# Patient Record
Sex: MALE | Race: WHITE | Employment: OTHER | URBAN - METROPOLITAN AREA
[De-identification: names, ages, dates, MRNs, and addresses within clinical notes are randomized per-mention and may not be internally consistent; named-entity substitution may affect disease eponyms.]

---

## 2019-04-11 ENCOUNTER — APPOINTMENT (OUTPATIENT)
Dept: CT IMAGING | Age: 84
DRG: 065 | End: 2019-04-11
Payer: MEDICARE

## 2019-04-11 ENCOUNTER — APPOINTMENT (OUTPATIENT)
Dept: GENERAL RADIOLOGY | Age: 84
DRG: 065 | End: 2019-04-11
Payer: MEDICARE

## 2019-04-11 ENCOUNTER — HOSPITAL ENCOUNTER (INPATIENT)
Age: 84
LOS: 4 days | Discharge: HOME | DRG: 065 | End: 2019-04-15
Attending: EMERGENCY MEDICINE | Admitting: INTERNAL MEDICINE
Payer: MEDICARE

## 2019-04-11 DIAGNOSIS — I63.9 ACUTE ISCHEMIC STROKE (HCC): Primary | ICD-10-CM

## 2019-04-11 DIAGNOSIS — R47.01 APHASIA: ICD-10-CM

## 2019-04-11 DIAGNOSIS — I63.9 ACUTE CVA (CEREBROVASCULAR ACCIDENT) (HCC): ICD-10-CM

## 2019-04-11 LAB
ANION GAP SERPL CALCULATED.3IONS-SCNC: 13 MEQ/L (ref 9–15)
ANISOCYTOSIS: ABNORMAL
APTT: >124 SEC (ref 21.6–35.4)
BASOPHILS ABSOLUTE: 0 K/UL (ref 0–0.2)
BASOPHILS RELATIVE PERCENT: 0.7 %
BUN BLDV-MCNC: 16 MG/DL (ref 8–23)
CALCIUM SERPL-MCNC: 9.3 MG/DL (ref 8.5–9.9)
CHLORIDE BLD-SCNC: 98 MEQ/L (ref 95–107)
CHP ED QC CHECK: NORMAL
CO2: 25 MEQ/L (ref 20–31)
CREAT SERPL-MCNC: 0.88 MG/DL (ref 0.7–1.2)
EKG ATRIAL RATE: 37 BPM
EKG Q-T INTERVAL: 466 MS
EKG QRS DURATION: 156 MS
EKG QTC CALCULATION (BAZETT): 495 MS
EKG R AXIS: 263 DEGREES
EKG T AXIS: 67 DEGREES
EKG VENTRICULAR RATE: 68 BPM
EOSINOPHILS ABSOLUTE: 0.1 K/UL (ref 0–0.7)
EOSINOPHILS RELATIVE PERCENT: 1.3 %
GFR AFRICAN AMERICAN: >60
GFR NON-AFRICAN AMERICAN: >60
GLUCOSE BLD-MCNC: 86 MG/DL
GLUCOSE BLD-MCNC: 86 MG/DL (ref 60–115)
GLUCOSE BLD-MCNC: 89 MG/DL (ref 70–99)
HCT VFR BLD CALC: 37.8 % (ref 42–52)
HEMOGLOBIN: 12.2 G/DL (ref 14–18)
INR BLD: 1.1
LYMPHOCYTES ABSOLUTE: 2.5 K/UL (ref 1–4.8)
LYMPHOCYTES RELATIVE PERCENT: 41.8 %
MCH RBC QN AUTO: 22.2 PG (ref 27–31.3)
MCHC RBC AUTO-ENTMCNC: 32.2 % (ref 33–37)
MCV RBC AUTO: 69.1 FL (ref 80–100)
MICROCYTES: ABNORMAL
MONOCYTES ABSOLUTE: 0.5 K/UL (ref 0.2–0.8)
MONOCYTES RELATIVE PERCENT: 8.9 %
NEUTROPHILS ABSOLUTE: 2.8 K/UL (ref 1.4–6.5)
NEUTROPHILS RELATIVE PERCENT: 47.3 %
PDW BLD-RTO: 16.5 % (ref 11.5–14.5)
PERFORMED ON: NORMAL
PLATELET # BLD: 138 K/UL (ref 130–400)
POC CREATININE WHOLE BLOOD: 0.9
POIKILOCYTES: ABNORMAL
POTASSIUM SERPL-SCNC: 4.5 MEQ/L (ref 3.4–4.9)
PROTHROMBIN TIME: 11.1 SEC (ref 9–11.5)
RBC # BLD: 5.48 M/UL (ref 4.7–6.1)
SODIUM BLD-SCNC: 136 MEQ/L (ref 135–144)
TROPONIN: <0.01 NG/ML (ref 0–0.01)
TROPONIN: <0.01 NG/ML (ref 0–0.01)
WBC # BLD: 5.9 K/UL (ref 4.8–10.8)

## 2019-04-11 PROCEDURE — 85025 COMPLETE CBC W/AUTO DIFF WBC: CPT

## 2019-04-11 PROCEDURE — 71045 X-RAY EXAM CHEST 1 VIEW: CPT

## 2019-04-11 PROCEDURE — 6370000000 HC RX 637 (ALT 250 FOR IP): Performed by: NURSE PRACTITIONER

## 2019-04-11 PROCEDURE — 6370000000 HC RX 637 (ALT 250 FOR IP): Performed by: INTERNAL MEDICINE

## 2019-04-11 PROCEDURE — 2580000003 HC RX 258: Performed by: EMERGENCY MEDICINE

## 2019-04-11 PROCEDURE — 85730 THROMBOPLASTIN TIME PARTIAL: CPT

## 2019-04-11 PROCEDURE — 70450 CT HEAD/BRAIN W/O DYE: CPT

## 2019-04-11 PROCEDURE — 85610 PROTHROMBIN TIME: CPT

## 2019-04-11 PROCEDURE — 82565 ASSAY OF CREATININE: CPT

## 2019-04-11 PROCEDURE — 2580000003 HC RX 258: Performed by: NURSE PRACTITIONER

## 2019-04-11 PROCEDURE — 82948 REAGENT STRIP/BLOOD GLUCOSE: CPT

## 2019-04-11 PROCEDURE — 36415 COLL VENOUS BLD VENIPUNCTURE: CPT

## 2019-04-11 PROCEDURE — 93005 ELECTROCARDIOGRAM TRACING: CPT

## 2019-04-11 PROCEDURE — 80048 BASIC METABOLIC PNL TOTAL CA: CPT

## 2019-04-11 PROCEDURE — 84484 ASSAY OF TROPONIN QUANT: CPT

## 2019-04-11 PROCEDURE — 6360000002 HC RX W HCPCS: Performed by: EMERGENCY MEDICINE

## 2019-04-11 PROCEDURE — 99285 EMERGENCY DEPT VISIT HI MDM: CPT

## 2019-04-11 PROCEDURE — 1210000000 HC MED SURG R&B

## 2019-04-11 RX ORDER — SODIUM CHLORIDE 0.9 % (FLUSH) 0.9 %
10 SYRINGE (ML) INJECTION PRN
Status: DISCONTINUED | OUTPATIENT
Start: 2019-04-11 | End: 2019-04-15 | Stop reason: HOSPADM

## 2019-04-11 RX ORDER — ATORVASTATIN CALCIUM 40 MG/1
40 TABLET, FILM COATED ORAL NIGHTLY
Status: DISCONTINUED | OUTPATIENT
Start: 2019-04-11 | End: 2019-04-15 | Stop reason: HOSPADM

## 2019-04-11 RX ORDER — ONDANSETRON 2 MG/ML
4 INJECTION INTRAMUSCULAR; INTRAVENOUS EVERY 6 HOURS PRN
Status: DISCONTINUED | OUTPATIENT
Start: 2019-04-11 | End: 2019-04-15 | Stop reason: HOSPADM

## 2019-04-11 RX ORDER — HEPARIN SODIUM 10000 [USP'U]/100ML
18 INJECTION, SOLUTION INTRAVENOUS CONTINUOUS
Status: DISCONTINUED | OUTPATIENT
Start: 2019-04-11 | End: 2019-04-12

## 2019-04-11 RX ORDER — HEPARIN SODIUM 5000 [USP'U]/ML
80 INJECTION, SOLUTION INTRAVENOUS; SUBCUTANEOUS PRN
Status: DISCONTINUED | OUTPATIENT
Start: 2019-04-11 | End: 2019-04-12

## 2019-04-11 RX ORDER — 0.9 % SODIUM CHLORIDE 0.9 %
1000 INTRAVENOUS SOLUTION INTRAVENOUS ONCE
Status: COMPLETED | OUTPATIENT
Start: 2019-04-11 | End: 2019-04-11

## 2019-04-11 RX ORDER — ASPIRIN 81 MG/1
81 TABLET ORAL DAILY
Status: DISCONTINUED | OUTPATIENT
Start: 2019-04-11 | End: 2019-04-15 | Stop reason: HOSPADM

## 2019-04-11 RX ORDER — SODIUM CHLORIDE 0.9 % (FLUSH) 0.9 %
10 SYRINGE (ML) INJECTION EVERY 12 HOURS SCHEDULED
Status: DISCONTINUED | OUTPATIENT
Start: 2019-04-11 | End: 2019-04-15 | Stop reason: HOSPADM

## 2019-04-11 RX ORDER — CARVEDILOL 12.5 MG/1
12.5 TABLET ORAL 2 TIMES DAILY WITH MEALS
Status: ON HOLD | COMMUNITY
End: 2021-01-15 | Stop reason: HOSPADM

## 2019-04-11 RX ORDER — CARVEDILOL 12.5 MG/1
12.5 TABLET ORAL 2 TIMES DAILY WITH MEALS
Status: DISCONTINUED | OUTPATIENT
Start: 2019-04-12 | End: 2019-04-15 | Stop reason: HOSPADM

## 2019-04-11 RX ORDER — HEPARIN SODIUM 5000 [USP'U]/ML
80 INJECTION, SOLUTION INTRAVENOUS; SUBCUTANEOUS ONCE
Status: COMPLETED | OUTPATIENT
Start: 2019-04-11 | End: 2019-04-11

## 2019-04-11 RX ORDER — HEPARIN SODIUM 5000 [USP'U]/ML
40 INJECTION, SOLUTION INTRAVENOUS; SUBCUTANEOUS PRN
Status: DISCONTINUED | OUTPATIENT
Start: 2019-04-11 | End: 2019-04-12

## 2019-04-11 RX ORDER — HYDRALAZINE HYDROCHLORIDE 20 MG/ML
10 INJECTION INTRAMUSCULAR; INTRAVENOUS EVERY 6 HOURS PRN
Status: DISCONTINUED | OUTPATIENT
Start: 2019-04-11 | End: 2019-04-15 | Stop reason: HOSPADM

## 2019-04-11 RX ADMIN — SODIUM CHLORIDE 1000 ML: 9 INJECTION, SOLUTION INTRAVENOUS at 18:24

## 2019-04-11 RX ADMIN — SACUBITRIL AND VALSARTAN 1 TABLET: 24; 26 TABLET, FILM COATED ORAL at 22:49

## 2019-04-11 RX ADMIN — ASPIRIN 81 MG: 81 TABLET, COATED ORAL at 22:01

## 2019-04-11 RX ADMIN — ATORVASTATIN CALCIUM 40 MG: 40 TABLET, FILM COATED ORAL at 22:49

## 2019-04-11 RX ADMIN — HEPARIN SODIUM 6350 UNITS: 5000 INJECTION INTRAVENOUS; SUBCUTANEOUS at 18:39

## 2019-04-11 RX ADMIN — Medication 10 ML: at 22:01

## 2019-04-11 RX ADMIN — HEPARIN SODIUM AND DEXTROSE 18 UNITS/KG/HR: 10000; 5 INJECTION INTRAVENOUS at 18:40

## 2019-04-11 ASSESSMENT — ENCOUNTER SYMPTOMS
VOMITING: 0
NAUSEA: 0
SORE THROAT: 0
BACK PAIN: 0
ABDOMINAL PAIN: 0
SHORTNESS OF BREATH: 0
DIARRHEA: 0

## 2019-04-11 NOTE — ED NOTES
Patient presents to the ER via [address] after having slurred speech around [phone] that started after he received a scam phone call trying to get money from him. Patient has a slight right sided facial droop, slurred speech, and expressive asphasia. Dr. Sury Guzman called to bedside. Patient is negative for weakness in extremities. Ambulatory when patient arrived to the ER. A&Ox4. Code Bat called at [facility].      Yesika Negrete RN  04/11/19 [id number]

## 2019-04-11 NOTE — ED NOTES
Bed: 05  Expected date: 4/11/19  Expected time: 5:51 PM  Means of arrival: [facility]  Comments:  79 yo male  Lightheaded, slurred speech, now resolved  186/150 HR 85 98% RA  18 L AC with labs OT [address], RN  04/11/19 5660

## 2019-04-11 NOTE — H&P
Hospital Medicine History & Physical      PCP: No primary care provider on file. Date of Admission: 4/11/2019    Date of Service: Pt seen/examined on 4/11/19 and Admitted to Inpatient with expected LOS greater than two midnights due to medical therapy. Chief Complaint:  Stroke like symptoms       History Of Present Illness:      80 y.o. male who presented to [facility] ED with right sided facial droop, slurred speech, and aphasia. History of atrial fibrillation on beta blocker but no blood thinner, pacemaker in place. [facility]. Patients wife states symptoms occurred around [id number] after getting a phone call from a [employer] service. He was shouting at person on other end of line and suddenly was unable to speak and form words. He denies every having extremity weakness, numbness, tingling, or loss of sensation. He was able to ambulate with a steady gait. He currently denies cp/sob/n/v/d/fever/chills/rash. He was started on heparin gtt per neurology due to his history of atrial fibrillation and not currently being on anticoagulants. Past Medical History:          Diagnosis Date    Atrial fibrillation ([facility] [address]     Colon cancer [facility])        Past Surgical History:          Procedure Laterality Date    PACEMAKER PLACEMENT         Medications Prior to Admission:      Prior to Admission medications    Medication Sig Start Date End Date Taking? Authorizing Provider   carvedilol (COREG) 12.5 MG tablet Take 12.5 mg by mouth 2 times daily (with meals)   Yes Historical Provider, MD   sacubitril-valsartan (ENTRESTO) 24-26 MG per tablet Take 1 tablet by mouth 2 times daily   Yes Historical Provider, MD       Allergies:  Penicillins    Social History:      The patient currently lives with wife    TOBACCO:   reports that he has never smoked. He has never used smokeless tobacco.  ETOH:   reports that he drinks alcohol.       Family History:       Reviewed in detail and

## 2019-04-11 NOTE — ED NOTES
Report called to 2N at this time. Patient placed on telemetry. No distress noted.       Mir Gutiérrez RN  04/11/19 1942

## 2019-04-11 NOTE — ED PROVIDER NOTES
[address] [facility] ED  eMERGENCY dEPARTMENT eNCOUnter      Pt Name: Samuel Rubi  MRN: [id number]  Jeevangfgage 11/19/1932  Date of evaluation: 4/11/2019  Provider: Jaida Guzman MD     CHIEF COMPLAINT       Chief Complaint   Patient presents with    Aphasia       HISTORYOF PRESENT ILLNESS   (Location/Symptom, Timing/Onset, Context/Setting, Quality, Duration, ModifyingFactors, Severity) Note limiting factors. 63-year-old male who was diagnosed with atrial fibrillation 2 weeks ago, has an artificial heart valve, but no prior history of stroke presents with sudden onset expressive aphasia that happened while he was having a fairly heated conversation with a supposedly tell a marker that was conducting a scan. He was very upset by this and was shouting at the individual on the phone. He then suddenly was unable to speak and had trouble forming his words as well as developed right lower facial droop. He had no weakness anywhere and was still able to ambulate without difficulty but couldn't get his words out. This has never happened before. He was very recently diagnosed with the atrial fibrillation and was prescribed an anticoagulant but has not yet picked it up from the pharmacy. Nursing Notes werereviewed. REVIEW OF SYSTEMS    (2+ for level 4; 10+ for level 5)     Review of Systems   Constitutional: Negative for chills and fever. HENT: Negative for sore throat. Eyes: Negative for visual disturbance. Respiratory: Negative for shortness of breath. Cardiovascular: Negative for chest pain. Gastrointestinal: Negative for abdominal pain, diarrhea, nausea and vomiting. Endocrine: Negative for polyuria. Genitourinary: Negative for dysuria. Musculoskeletal: Negative for back pain and neck pain. Skin: Negative for rash. Neurological: Positive for facial asymmetry and speech difficulty. Negative for dizziness, weakness and headaches. Hematological: Negative for adenopathy. Psychiatric/Behavioral: Negative for confusion. All other systems reviewed and are negative. Except as noted above the remainder of the review of systems was reviewed and negative. PAST MEDICAL HISTORY     Past Medical History:   Diagnosis Date    Atrial fibrillation ([facility] [address]     Colon cancer ([facility]        SURGICAL HISTORY        Past Surgical History:   Procedure Laterality Date    PACEMAKER PLACEMENT         CURRENT MEDICATIONS       Previous Medications    CARVEDILOL (COREG) 12.5 MG TABLET    Take 12.5 mg by mouth 2 times daily (with meals)    SACUBITRIL-VALSARTAN (ENTRESTO) 24-26 MG PER TABLET    Take 1 tablet by mouth 2 times daily       ALLERGIES     Penicillins    FAMILY HISTORY     History reviewed. No pertinent family history.        SOCIAL HISTORY       Social History     Socioeconomic History    Marital status:      Spouse name: None    Number of children: None    Years of education: None    Highest education level: None   Occupational History    None   Social Needs    Financial resource strain: None    Food insecurity:     Worry: None     Inability: None    Transportation needs:     Medical: None     Non-medical: None   Tobacco Use    Smoking status: Never Smoker    Smokeless tobacco: Never Used   Substance and Sexual Activity    Alcohol use: Yes     Comment: glass of wine daily     Drug use: None    Sexual activity: None   Lifestyle    Physical activity:     Days per week: None     Minutes per session: None    Stress: None   Relationships    Social connections:     Talks on phone: None     Gets together: None     Attends Mandaeism service: None     Active member of club or organization: None     Attends meetings of clubs or organizations: None     Relationship status: None    Intimate partner violence:     Fear of current or ex partner: None     Emotionally abused: None     Physically abused: None     Forced sexual activity: None   Other Topics Concern    None Social History Narrative    None       SCREENINGS   NIH Stroke Scale  Interval: Pre-Treatment  Level of Consciousness (1a. ): Alert  LOC Questions (1b. ): Answers both correctly  LOC Commands (1c. ): Obeys both correctly  Best Gaze (2. ): Normal  Visual (3. ): No visual loss  Facial Palsy (4. ): Normal  Motor Arm, Left (5a. ): No drift  Motor Arm, Right (5b. ): No drift  Motor Leg, Left (6a. ): No drift  Motor Leg, Right (6b. ): No drift  Limb Ataxia (7. ): Absent  Sensory (8. ): Normal  Best Language (9. ): Mild to moderate aphasia  Dysarthria (10. ): Normal  Extinction and Inattention (11): No neglect  Total: 1       PHYSICAL EXAM    (up to 7 for level 4, 8 or more for level 5)     ED Triage Vitals   BP Temp Temp Source Pulse Resp SpO2 Height Weight   04/11/19 1754 04/11/19 1754 04/11/19 1754 04/11/19 1754 04/11/19 1754 04/11/19 1838 -- 04/11/19 1808   (!) 168/110 98.6 °F (37 °C) Oral 76 16 99 %  174 lb 9.7 oz (79.2 kg)       Physical Exam   Constitutional: He is oriented to person, place, and time. He appears well-developed and well-nourished. No distress. HENT:   Head: Normocephalic and atraumatic. Nose: Nose normal.   Mouth/Throat: No oropharyngeal exudate. Eyes: Pupils are equal, round, and reactive to light. Conjunctivae are normal. Right eye exhibits no discharge. Left eye exhibits no discharge. Neck: Normal range of motion. Neck supple. Cardiovascular: Normal rate. Exam reveals no friction rub. No murmur heard. Artificial heart valve   Pulmonary/Chest: Effort normal and breath sounds normal. No stridor. No respiratory distress. He has no wheezes. Abdominal: Soft. Bowel sounds are normal. He exhibits no distension. There is no rebound and no guarding. Musculoskeletal: Normal range of motion. He exhibits no edema or tenderness. Lymphadenopathy:     He has no cervical adenopathy. Neurological: He is alert and oriented to person, place, and time. He displays normal reflexes.  No cranial nerve deficit. Coordination normal.   Skin: Skin is warm and dry. No rash noted. Psychiatric: He has a normal mood and affect. His behavior is normal. Thought content normal.   Nursing note and vitals reviewed. DIAGNOSTIC RESULTS     EKG: All EKG's are interpreted by the EmergencyDepartment Physician who either signs or Co-signs this chart in the absence of a cardiologist.    Ventricular paced rhythm, rate 68, biventricular pacemaker detected, no acute ischemic findings    RADIOLOGY:   Non-plain film images such as CT, Ultrasound and MRI are read by the radiologist. Plain radiographic images are visualized and preliminarily interpreted by the emergency physician with the below findings:    Initial noncontrast brain CT negative per radiology. Results were called immediately on availability. Interpretation per the Radiologist below (ifavailable at the time of note entry):    XR CHEST PORTABLE   Final Result   NO ACUTE CHEST DISEASE. CT Head WO Contrast   Final Result   NEGATIVE CT BRAIN WITHOUT CONTRAST. Results were called by me to Gal Jamison MD in the ED at 4/11/2019 6:14 PM.                LABS:  Labs Reviewed   CBC WITH AUTO DIFFERENTIAL - Abnormal; Notable for the following components:       Result Value    Hemoglobin 12.2 (*)     Hematocrit 37.8 (*)     MCV 69.1 (*)     MCH 22.2 (*)     MCHC 32.2 (*)     RDW 16.5 (*)     All other components within normal limits   POCT GLUCOSE - Normal   POCT CREATININE - Normal   BASIC METABOLIC PANEL   PROTIME-INR   TROPONIN   APTT   APTT   APTT   POCT GLUCOSE       All other labs were within normal range or not returned as of this dictation.     EMERGENCY DEPARTMENT COURSE and DIFFERENTIAL DIAGNOSIS/MDM:   Vitals:    Vitals:    04/11/19 1754 04/11/19 1758 04/11/19 1808 04/11/19 1838   BP: (!) 168/110   (!) 166/85   Pulse: 76 66     Resp: 16      Temp: 98.6 °F (37 °C)      TempSrc: Oral      SpO2:    99%   Weight:   174 lb 9.7 oz (79.2 kg) [facility]    He was made a stroke team on arrival.  He had slight right lower facial droop, expressive aphasia, but no other symptoms. He was still able to joke and smile. He is not confused at all. His noncontrast brain CT was negative. I discussed the case with Dr. Martha Peters, the on-call stroke neurologist who recommended having a discussion with the family regarding the risks of TPA because this particular type of stroke is at a higher risk for bleeding. I spoke at length with the patient, his wife, and his daughter and they do not want to proceed with TPA administration. TPA was therefore not administered, on entering the wishes of the patient and his family members. He states that he is able to live with these symptoms and he can interact quite well and is actually fairly jovial and able to smile at his wife. He absolutely does not want to proceed with TPA and he appears to have capacity to make an informed decision about his healthcare. I started him on a heparin drip and we will admit him to the hospital for further testing. I spoke at length with his family members as well regarding the course of treatment and his other test results. On reexamination, his expressive aphasia is unchanged but he is able to interact quite well with family and can move all his extremities without difficulty and appears to be able to perform his activities of daily living without trouble. I spoke with Dr. Keri Valenzuela and he will be admitted to telemetry. CRITICAL CARE TIME     Total Critical Care time(not applicable if blank) 45    Total minutes, excluding separately reportable procedures. There was a high probability of clinically significant/life threatening deterioration in the patient's condition which required my urgent intervention.  This includesdiscussing the case with consultants, reviewing laboratory studies and images independently, arranging disposition, and speaking with

## 2019-04-12 ENCOUNTER — APPOINTMENT (OUTPATIENT)
Dept: MRI IMAGING | Age: 84
DRG: 065 | End: 2019-04-12
Payer: MEDICARE

## 2019-04-12 ENCOUNTER — APPOINTMENT (OUTPATIENT)
Dept: ULTRASOUND IMAGING | Age: 84
DRG: 065 | End: 2019-04-12
Payer: MEDICARE

## 2019-04-12 LAB
ANION GAP SERPL CALCULATED.3IONS-SCNC: 11 MEQ/L (ref 9–15)
APTT: 47.6 SEC (ref 21.6–35.4)
APTT: 71.8 SEC (ref 21.6–35.4)
BUN BLDV-MCNC: 14 MG/DL (ref 8–23)
CALCIUM SERPL-MCNC: 8.3 MG/DL (ref 8.5–9.9)
CHLORIDE BLD-SCNC: 101 MEQ/L (ref 95–107)
CHOLESTEROL, TOTAL: 138 MG/DL (ref 0–199)
CO2: 23 MEQ/L (ref 20–31)
CREAT SERPL-MCNC: 0.66 MG/DL (ref 0.7–1.2)
GFR AFRICAN AMERICAN: >60
GFR NON-AFRICAN AMERICAN: >60
GLUCOSE BLD-MCNC: 91 MG/DL (ref 70–99)
HCT VFR BLD CALC: 35.2 % (ref 42–52)
HDLC SERPL-MCNC: 45 MG/DL (ref 40–59)
HEMOGLOBIN: 11.2 G/DL (ref 14–18)
LDL CHOLESTEROL CALCULATED: 83 MG/DL (ref 0–129)
LV EF: 55 %
LVEF MODALITY: NORMAL
MCH RBC QN AUTO: 22 PG (ref 27–31.3)
MCHC RBC AUTO-ENTMCNC: 31.8 % (ref 33–37)
MCV RBC AUTO: 69.2 FL (ref 80–100)
PDW BLD-RTO: 16.7 % (ref 11.5–14.5)
PLATELET # BLD: 119 K/UL (ref 130–400)
POTASSIUM REFLEX MAGNESIUM: 3.9 MEQ/L (ref 3.4–4.9)
RBC # BLD: 5.09 M/UL (ref 4.7–6.1)
SODIUM BLD-SCNC: 135 MEQ/L (ref 135–144)
TRIGL SERPL-MCNC: 52 MG/DL (ref 0–150)
TROPONIN: <0.01 NG/ML (ref 0–0.01)
WBC # BLD: 5.5 K/UL (ref 4.8–10.8)

## 2019-04-12 PROCEDURE — 6370000000 HC RX 637 (ALT 250 FOR IP): Performed by: PSYCHIATRY & NEUROLOGY

## 2019-04-12 PROCEDURE — APPNB30 APP NON BILLABLE TIME 0-30 MINS: Performed by: NURSE PRACTITIONER

## 2019-04-12 PROCEDURE — 6370000000 HC RX 637 (ALT 250 FOR IP): Performed by: INTERNAL MEDICINE

## 2019-04-12 PROCEDURE — 80061 LIPID PANEL: CPT

## 2019-04-12 PROCEDURE — 6370000000 HC RX 637 (ALT 250 FOR IP): Performed by: NURSE PRACTITIONER

## 2019-04-12 PROCEDURE — 36415 COLL VENOUS BLD VENIPUNCTURE: CPT

## 2019-04-12 PROCEDURE — 97165 OT EVAL LOW COMPLEX 30 MIN: CPT

## 2019-04-12 PROCEDURE — 93010 ELECTROCARDIOGRAM REPORT: CPT | Performed by: INTERNAL MEDICINE

## 2019-04-12 PROCEDURE — 93880 EXTRACRANIAL BILAT STUDY: CPT

## 2019-04-12 PROCEDURE — 93306 TTE W/DOPPLER COMPLETE: CPT

## 2019-04-12 PROCEDURE — 97161 PT EVAL LOW COMPLEX 20 MIN: CPT

## 2019-04-12 PROCEDURE — 70544 MR ANGIOGRAPHY HEAD W/O DYE: CPT

## 2019-04-12 PROCEDURE — 99223 1ST HOSP IP/OBS HIGH 75: CPT | Performed by: INTERNAL MEDICINE

## 2019-04-12 PROCEDURE — 2580000003 HC RX 258: Performed by: NURSE PRACTITIONER

## 2019-04-12 PROCEDURE — 6360000002 HC RX W HCPCS: Performed by: NURSE PRACTITIONER

## 2019-04-12 PROCEDURE — 92523 SPEECH SOUND LANG COMPREHEN: CPT

## 2019-04-12 PROCEDURE — 85730 THROMBOPLASTIN TIME PARTIAL: CPT

## 2019-04-12 PROCEDURE — 1210000000 HC MED SURG R&B

## 2019-04-12 PROCEDURE — 92610 EVALUATE SWALLOWING FUNCTION: CPT

## 2019-04-12 PROCEDURE — 70551 MRI BRAIN STEM W/O DYE: CPT

## 2019-04-12 PROCEDURE — 93880 EXTRACRANIAL BILAT STUDY: CPT | Performed by: INTERNAL MEDICINE

## 2019-04-12 PROCEDURE — 84484 ASSAY OF TROPONIN QUANT: CPT

## 2019-04-12 PROCEDURE — 80048 BASIC METABOLIC PNL TOTAL CA: CPT

## 2019-04-12 PROCEDURE — 85027 COMPLETE CBC AUTOMATED: CPT

## 2019-04-12 RX ADMIN — HEPARIN SODIUM AND DEXTROSE 15 UNITS/KG/HR: 10000; 5 INJECTION INTRAVENOUS at 00:07

## 2019-04-12 RX ADMIN — CARVEDILOL 12.5 MG: 12.5 TABLET, FILM COATED ORAL at 08:21

## 2019-04-12 RX ADMIN — CARVEDILOL 12.5 MG: 12.5 TABLET, FILM COATED ORAL at 17:27

## 2019-04-12 RX ADMIN — SACUBITRIL AND VALSARTAN 1 TABLET: 24; 26 TABLET, FILM COATED ORAL at 20:16

## 2019-04-12 RX ADMIN — Medication 10 ML: at 20:18

## 2019-04-12 RX ADMIN — HEPARIN SODIUM AND DEXTROSE 13 UNITS/KG/HR: 10000; 5 INJECTION INTRAVENOUS at 07:20

## 2019-04-12 RX ADMIN — Medication 10 ML: at 08:21

## 2019-04-12 RX ADMIN — SACUBITRIL AND VALSARTAN 1 TABLET: 24; 26 TABLET, FILM COATED ORAL at 08:21

## 2019-04-12 RX ADMIN — ASPIRIN 81 MG: 81 TABLET, COATED ORAL at 08:21

## 2019-04-12 RX ADMIN — APIXABAN 5 MG: 5 TABLET, FILM COATED ORAL at 17:26

## 2019-04-12 RX ADMIN — ATORVASTATIN CALCIUM 40 MG: 40 TABLET, FILM COATED ORAL at 20:16

## 2019-04-12 ASSESSMENT — ENCOUNTER SYMPTOMS
RESPIRATORY NEGATIVE: 1
SHORTNESS OF BREATH: 0
GASTROINTESTINAL NEGATIVE: 1
ALLERGIC/IMMUNOLOGIC NEGATIVE: 1
APNEA: 0
CHOKING: 0
COUGH: 0
STRIDOR: 0
BLOOD IN STOOL: 0
CHEST TIGHTNESS: 0
WHEEZING: 0
DIARRHEA: 0
EYES NEGATIVE: 1
NAUSEA: 0
VOMITING: 0

## 2019-04-12 NOTE — PROGRESS NOTES
Facility/Department: [facility]  Initial Speech/Language/Cognitive Assessment    NAME: Florie Spatz  : 1932   MRN: [id number]  ADMISSION DATE: 2019  ADMITTING DIAGNOSIS: has Acute CVA (cerebrovascular accident) [facility]) on their problem list.  DATE ONSET: 2019    Date of Eval: 2019   Evaluating Therapist: Yumiko Hinson, SLP    Assessment:  Cognitive Diagnosis: Mild cognitive-linguistic impairment characterized by impaired STM abilities, decreased complex problem solving abilities, and decreased insight into present deficits. Aphasia Diagnosis: Mild-moderate expressive aphasia characterized by impaired word finding abilities at the sentence and conversation levels. Speech Diagnosis: Mild-moderate dysarthria characterized by generalized right oral weakness resulting in imprecise speech movements with decreased intelligibility at all levels of speech. Communication Diagnosis: WFL   Diagnosis: Mild cognitive-linguistic impairment: Mild-moderate expressive aphasia; Mild-moderate dysarthria     Recommendations:  Requires SLP Intervention: Yes  Duration/Frequency of Treatment: 2-3x/week for LOS    Goals:  Short-term Goals  Timeframe for Short-term Goals: 1-2 weeks  Goal 1: Pt will be educated on strategies to improve speech intelligibility (abdominally supported breathing, over-articulation, etc.), and utilize them at the conversational level with min verbal cues from SLP so the patient has a better understanding of strategies that will enhance his/her ability to express his/her wants and needs. Goal 2: Pt will complete OM drills paired with speech sounds to improve speech intelligibility x10/each and expression of his/her complex thoughts, needs, feelings, and ideas.    Goal 3: Pt will be educated on word-finding strategies, and use them in structured and unstructured tasks in 80% of given opportunities with mild verbal cues to help the pt express his personal, safety, and medical needs in the Expression  Verbal Expression: Exceptions to functional limits  Responsive: Mild  Conversation: Mild  Effective Techniques: Provide extra time    Written Expression  Written Expression: (dnt)    Motor Speech  Motor Speech: Exceptions to [facility]  Intelligibility: Mild  Dysarthria : Mild    Pragmatics/Social Functioning  Pragmatics: Within functional limits    Cognition:      Orientation  Overall Orientation Status: Within Normal Limits  Attention  Attention: Within Functional Limits  Memory  Memory: Exceptions to [facility]  Short-term Memory: Mild  Problem Solving  Problem Solving: Exceptions to [facility]  Complex Functional Tasks: Mild  Verbal Reasoning Skills: Mild  Numeric Reasoning  Numeric Reasoning: (dnt)  Abstract Reasoning  Abstract Reasoning: (dnt)  Safety/Judgement  Safety/Judgement: Exceptions to [facility]  Insight: Mild      Prognosis:  Speech Therapy Prognosis  Prognosis: Good  Prognosis Considerations: Age;Previous Level of Function  Individuals consulted  Consulted and agree with results and recommendations: Patient; Family member;RN  Family member consulted: Daughter    Education:  Patient Education: Results of SLE  Patient Education Response: Demonstrated understanding;Needs reinforcement    Pain:  Pain Assessment  Patient Currently in Pain: Denies             Therapy Time:   Individual Concurrent Group Co-treatment   Time In 0830         Time Out 0845         Minutes [address], [address] [facility], Date: 4/12/2019, Time: 9:04 AM

## 2019-04-12 NOTE — PROGRESS NOTES
Pt arrived in MRI area and is having The [address] prepared for MRI use by technician. Rate set at [facility] 60. Pt is alert and oriented and being connected to MRI monitor. Continues to rest quietly during MRI, monitor shows A-fib with rate of 60. At 1:45PM, MRI completed. Pt's AICD adjusted for therapeutic use. Pt tolerated well.

## 2019-04-12 NOTE — PROGRESS NOTES
Lab called critical APTT greater than 124. Stopped Heparin infusion and  messaged Dr Strange Mad instructed to follow protocol for if greater than 70 hold for one hour decrease by 3. Will continue to monitor patient.

## 2019-04-12 NOTE — PROGRESS NOTES
[facility] OCCUPATIONAL THERAPY EVALUATION - ACUTE     Date: 2019  Patient Name: Joseph Kiser        MRN: [id number]  Account: [de-identified]   : 1932  (80 y.o.)  Room: [place] [address]    Chart Review:  Diagnosis:  The encounter diagnosis was Acute ischemic stroke ([facility] Past Medical History:   Diagnosis Date    Atrial fibrillation ([facility]     Colon cancer [facility])      Past Surgical History:   Procedure Laterality Date    PACEMAKER PLACEMENT         Restrictions  Restrictions/Precautions: Fall Risk     Safety Devices: Safety Devices  Safety Devices in place: Yes  Type of devices:  All fall risk precautions in place    Subjective  Pre Treatment Pain Screening  Pain at present: 0  Scale Used: Numeric Score  Intervention List: Patient able to continue with treatment    Pain Reassessment:   Pain Assessment  Patient Currently in Pain: Denies       Orientation  Orientation  Overall Orientation Status: Within Functional Limits    Prior Level of Function:  Social/Functional History  Lives With: Spouse  Type of Home: House  Home Layout: One level(with 8 steps, 1 HR to basement)  Home Access: Stairs to enter without rails  Entrance Stairs - Number of Steps: 2 then 1 step up to landing  Bathroom Shower/Tub: Walk-in shower, Tub/Shower unit(Walk-in shower in basement, Tub/Shower on 1st floor)  Bathroom Equipment: Grab bars in shower  ADL Assistance: Independent  Homemaking Assistance: Independent  Ambulation Assistance: Independent  Transfer Assistance: Independent  Active : Yes    OBJECTIVE:     Orientation Status:  Orientation  Overall Orientation Status: Within Functional Limits    Observation:  Observation/Palpation  Observation: Mild right sided facial droop noted    Cognition Status:  Cognition  Overall Cognitive Status: WFL    Perception Status:  Perception  Overall Perceptual Status: WFL    Sensation Status:  Sensation  Overall Sensation Status: WFL    Vision and Hearing Status:  Vision  Vision: Within Functional Limits  Hearing  Hearing: Within functional limits     ROM:   LUE AROM (degrees)  LUE AROM : WFL  Left Hand AROM (degrees)  Left Hand AROM: WFL  RUE AROM (degrees)  RUE AROM : WFL  Right Hand AROM (degrees)  Right Hand AROM: WFL    Strength:  LUE Strength  LUE Strength Comment: Gross Assessment: 4/5  RUE Strength  RUE Strength Comment: Gross Assessment: 4/5    Coordination, Tone, Quality of Movement: Tone RUE  RUE Tone: Normotonic  Tone LUE  LUE Tone: Normotonic  Coordination  Movements Are Fluid And Coordinated: Yes    Hand Dominance:  Hand Dominance  Hand Dominance: Right    ADL Status:  ADL  Feeding: Independent  Grooming: Modified independent   UE Bathing: Independent  LE Bathing: Modified independent   UE Dressing: Independent  LE Dressing: Modified independent   Toileting: None(Anticipate modified Independent based on functional observation)  Toilet Transfers  Toilet Transfer: Unable to assess  Toilet Transfers Comments: Anticipate Modified Independent based on functional observation  Tub Transfers  Tub Transfers: Not tested    Functional Mobility:  Functional Mobility  Activity: Other  Assist Level: Independent  Functional Mobility Comments: Patient ambulates throughout hallway at independent level with no LOB noted  Transfers  Sit to stand: Independent  Stand to sit:  Independent    Bed Mobility  Bed mobility  Supine to Sit: Independent  Sit to Supine: Independent    Seated and Standing Balance:  Balance  Sitting Balance: Independent  Standing Balance: Independent    Functional Endurance:  Activity Tolerance  Activity Tolerance: Patient Tolerated treatment well    D/C Recommendations:    Equipment Recommendations:      OT Follow Up:  OT D/C RECOMMENDATIONS  REQUIRES OT FOLLOW UP: No       Assessment/Discharge Disposition:     Prognosis: Good  No Skilled OT: Independent with ADL's  History: Multiple comorbidities  Exam: No performance deficits noted  Assistance / Modification: Independent to Modified Independent with self-care skills    Six Click Score   How much help for putting on and taking off regular lower body clothing?: None  How much help for Bathing?: None  How much help for Toileting?: None  How much help for putting on and taking off regular upper body clothing?: None  How much help for taking care of personal grooming?: None  How much help for eating meals?: None  AM-PAC Inpatient Daily Activity Raw Score: 24  AM-PAC Inpatient ADL T-Scale Score : 57.54  ADL Inpatient CMS 0-100% Score: 0    Plan:  Plan  Times per week: Patient is performing at independent-modified independent level for self-care skills. No further OT indicated at this time. Goals:   Patient is independent with self-care skills, no further OT recommended at this time.     Patient Goal:    To return home   Discussed and agreed upon: Yes Comments:     Therapy Time:   OT Individual Minutes  Time In: 5717  Time Out: 9860  Minutes: 15    Electronically signed by:    BENEDICTO Patiño  4/12/2019, 10:33 AM

## 2019-04-12 NOTE — PROGRESS NOTES
Facility/Department: [address] NEURO   BEDSIDE SWALLOW EVALUATION    NAME: Juliana Byrne  : 1932  MRN: [id number]    ADMISSION DATE: 2019  ADMITTING DIAGNOSIS: has Acute CVA (cerebrovascular accident) [facility]) on their problem list.    ONSET DATE: 2019    Date of Eval: 2019  Evaluating Therapist: Steve oRsas    Recommended Diet and Intervention  Diet Solids Recommendation: Regular  Liquid Consistency Recommendation: Thin  Recommended Form of Meds: Whole with water  Therapeutic Interventions: Diet tolerance monitoring, Patient/Family education, Oral motor exercises    Compensatory Swallowing Strategies  Compensatory Swallowing Strategies: Lingual sweep; Alternate solids and liquids;Small bites/sips;Upright as possible for all oral intake    Reason for Referral  Juliana Byrne was referred for a bedside swallow evaluation to assess the efficiency of his swallow function, identify signs and symptoms of aspiration and make recommendations regarding safe dietary consistencies, effective compensatory strategies, and safe eating environment. General  Chart Reviewed: Yes  Behavior/Cognition: Alert; Cooperative;Pleasant mood  Respiratory Status: Room air  O2 Device: None (Room air)  Communication Observation: Aphasia; Dysarthria  Follows Directions: Simple  Dentition: Adequate  Patient Positioning: Upright in bed  Baseline Vocal Quality: Normal  Prior Dysphagia History: None  Consistencies Administered: Reg solid;Puree; Thin - straw  Current Diet level:  Current Diet : Regular  Current Liquid Diet : Thin    Oral Motor Deficits  Oral/Motor  Oral Motor: Exceptions to [facility]  Labial Symmetry: Abnormal symmetry right  Lingual ROM: Reduced right  Lingual Symmetry: Abnormal symmetry right  Lingual Strength: Reduced    Oral Phase Dysfunction  Oral Phase  Oral Phase: Exceptions  Oral Phase Dysfunction  Pocketing Right: Reg solid;Puree  Lingual/Palatal Residue: Reg solid;Puree  Oral Phase  Oral Phase - Comment: Mild oral dysphagia characterized by unsensed pocketing in right buccal cavity per regular solids and puree and minimal residuals on superior tongue following regular solids and puree. Pt cleared pocketed residuals with cued use of liquid wash and lingual sweep. Indicators of Pharyngeal Phase Dysfunction   Pharyngeal Phase  Pharyngeal Phase: WFL  Pharyngeal Phase   Pharyngeal: WFL at this time. Prompt initiation of the pharyngeal swallow with adequate laryngeal elevation determined via palpation. No overt s/s of aspiration observed. Impression  Dysphagia Diagnosis: Mild oral stage dysphagia  Dysphagia Impression : Mild oral dysphagia   Dysphagia Outcome Severity Scale: Level 5: Mild dysphagia- Distant supervision. May need one diet consistency restricted     Treatment Plan  Requires SLP Intervention: Yes  Duration/Frequency of Treatment: 1-3x/week for LOS     Treatment/Goals  Short-term Goals  Timeframe for Short-term Goals: 1-2 weeks  Goal 1: Pt will tolerate the current diet with adequate mastication and oral bolus clearance without overt s/s of aspiration. Goal 2: Pt will complete oral motor ROM and strengthening exercises with 80% accuracy, given cues as needed, in order to strengthen lingual/labial/buccal musculature to promote safety and efficiency of oral phase of swallow and decrease risk for pocketing. Goal 3: Pt will demonstrate cyclical ingestion, lingual sweep in right buccal cavity, and small bites/sips for safe and efficient swallow of all consistencies in all given opportunities. Long-term Goals  Timeframe for Long-term Goals: 1-2 weeks  Goal 1: Pt will tolerate the least restrictive diet without overt s/s of aspiration. Dysphagia Goals:  The patient will tolerate recommended diet without observed clinical signs of aspiration      Prognosis  Prognosis  Prognosis for safe diet advancement: good  Barriers to reach goals: age  Individuals consulted  Consulted and agree with results and recommendations: Patient; Family member;RN(LAURIE Fish)  Family member consulted: Daughter    Education  Patient Education: Results of BSE  Patient Education Response: Demonstrated understanding      Pain:  Pain Assessment  Patient Currently in Pain: Denies       Therapy Time  SLP Individual Minutes  Time In: 0820  Time Out: 0830  Minutes: [address], [facility]-SLP, Date: 4/12/2019, Time: 8:53 AM

## 2019-04-12 NOTE — CONSULTS
Consult to Neurology  Consult performed by: Anurag Quick MD  Consult ordered by: Lexy Fuller, APRN - CNP      Left CVA embolic,  Afib 8 days seen on monitering  Aphasia, expressive  Dysarthria  Change to Eliquis  Discussed with family      Tre Rangel MD, Edelmira Salcedo, American Board of Psychiatry & Neurology  Board Certified in Vascular Neurology  Board Certified in Neuromuscular Medicine  Certified in . [address]

## 2019-04-12 NOTE — PROGRESS NOTES
Selene Lu is a 80 y.o. male patient. Pt was seen and evaluated on heparin drip going for MRI     Current Facility-Administered Medications   Medication Dose Route Frequency Provider Last Rate Last Dose    heparin (porcine) injection 6,350 Units  80 Units/kg Intravenous PRN JESSE Jason CNP        heparin (porcine) injection 3,150 Units  40 Units/kg Intravenous PRN JESSE Jason CNP        heparin 25,000 units in dextrose 5% 250 mL infusion  18 Units/kg/hr Intravenous Continuous JESSE Jason CNP 10.3 mL/hr at 04/12/19 0720 13 Units/kg/hr at 04/12/19 0720    sodium chloride flush 0.9 % injection 10 mL  10 mL Intravenous 2 times per day JESSE Jason CNP   10 mL at 04/12/19 1127    sodium chloride flush 0.9 % injection 10 mL  10 mL Intravenous PRN JESSE Jason - CNP        magnesium hydroxide (MILK OF MAGNESIA) 400 MG/5ML suspension 30 mL  30 mL Oral Daily PRN JESSE Jason CNP        ondansetron (ZOFRAN) injection 4 mg  4 mg Intravenous Q6H PRN JESSE Jason CNP        aspirin EC tablet 81 mg  81 mg Oral Daily JESSE Jason CNP   81 mg at 04/12/19 4726    atorvastatin (LIPITOR) tablet 40 mg  40 mg Oral Nightly Placido Bynum MD   40 mg at 04/11/19 2249    hydrALAZINE (APRESOLINE) injection 10 mg  10 mg Intravenous Q6H PRN Placido Bynum MD        carvedilol (COREG) tablet 12.5 mg  12.5 mg Oral BID  Margret Swartz MD   12.5 mg at 04/12/19 2156    sacubitril-valsartan (ENTRESTO) 24-26 MG per tablet 1 tablet  1 tablet Oral BID Margret Swartz MD   1 tablet at 04/12/19 0873     Allergies   Allergen Reactions    Morphine      hallucinations       Active Problems:    Acute ischemic stroke ([facility] [address]    Acute CVA (cerebrovascular accident) ([facility] [address]  Resolved Problems:    * No resolved hospital problems. *    Blood pressure (!) 142/94, pulse 60, temperature 98.2 °F (36.8 °C), temperature source Oral, resp.  rate 16, height 5' 3\" (1.6 m), weight 169 lb 1.6 oz (76.7 kg), SpO2 95 %. Subjective:  Symptoms:  No shortness of breath, malaise, cough, chest pain, weakness, headache, chest pressure, anorexia, diarrhea or anxiety. Diet:  No nausea or vomiting. Objective:  General Appearance:  Comfortable, well-appearing and in no acute distress. Vital signs: (most recent): Blood pressure (!) 142/94, pulse 60, temperature 98.2 °F (36.8 °C), temperature source Oral, resp. rate 16, height 5' 3\" (1.6 m), weight 169 lb 1.6 oz (76.7 kg), SpO2 95 %. HEENT: Normal HEENT exam.    Lungs:  Normal effort and normal respiratory rate. Breath sounds clear to auscultation. Heart: Normal rate. Regular rhythm. S1 normal.    Abdomen: Abdomen is soft. Bowel sounds are normal.     Pulses: Distal pulses are intact. Neurological: Patient is alert and oriented to person, place and time. Pupils:  Pupils are equal, round, and reactive to light. Skin:  Warm and dry.       Assessment & Plan  1) afib rate controlled  2) CVA  FU MRI   Carotid u/s  Echo  3) HTN stable  Possible d/c in 1 to 2 days, minimal dificits  Fe Heredia MD  4/12/2019

## 2019-04-12 NOTE — CONSULTS
[address]                      [address], [address] [facility]                                  CONSULTATION    PATIENT NAME: Bravo Crockett                      :        1932  MED REC NO:   [id number]                            ROOM:       K089  ACCOUNT NO:   [de-identified]                           ADMIT DATE: 2019  PROVIDER:     Srinivas Murrell MD    CONSULT DATE:  2019    ATTENDING:  Monika Andre MD.    HISTORY OF PRESENT ILLNESS:  This is an 26-year-old right-handed  gentleman who was admitted with a history of stroke. The patient  presented with aphasia. He was on the phone with some telemarketing,  got very upset, and then he could not speak. This was sudden and he had  word finding difficulty. The wife reports he had improved considerably  by the time he came to the emergency room. Dr. Zev Crystal was consulted  and TPA was recommended. There is long discussion with the daughter. They did not want TPA. In any case, the patient has now sustained a  stroke. He has mild aphasia with word finding difficulty with  dysarthria. As mentioned, the patient has known history of atrial  fibrillation diagnosed two weeks ago with a monitor and he was supposed  to get Eliquis through mail. It has been two weeks, he did not get any. At this time, he is not complaining of any headache. Denies any nausea,  vomiting, chest pain, shortness of breath. Does not complain of any  bleeding, bruising, choking, drooling. He was able to ambulate with a  nurse. He has no previous history of stroke, TIA, seizures, head  injury, trauma, or dementia. PAST MEDICAL HISTORY:  Remarkable for valvular heart disease. He had a  procedure done with bovine valve. He was not on anticoagulation. History of colon cancer, history of pacemaker placement.     MEDICATIONS:  He is on aspirin, atorvastatin, he was on heparin, which I  discontinued, started him on follow him with you. The patient has aphasia and  dysarthria but no true gait ataxia. The patient should be left in the  hospital for at least 72 hours. Thank you Dr. Khushboo Mac for asking us to assist in the care of this  patient.       Artur Lopez MD    D: 04/12/2019 15:14:17       T: 04/12/2019 19:40:57     [email or web address]  Job#: [id number]     Doc#: [id number]    CC:

## 2019-04-12 NOTE — PROGRESS NOTES
Patient arrived to  via cart. Ambulated to bed. Family at bedside. Vital signs stable, patient oriented to room. 2017- assesment complete patient A&O x4, nursing bedside swallow eval performed patient had no difficulty swallowing. Will continue to monitor patient.

## 2019-04-12 NOTE — CARE COORDINATION
I MET WITH PATIENT AND WIFE. HE IS FROM HOME WITH WIFE. HE IS INDEPENDENT. USES NO DME, BUT HAS WALKER AND CANE. HE AND WIFE BOTH DRIVE. PATIENT WILL BE DISCHARGED ON ELIQUIS AND WIFE EXPECTS DELIVERY OF MEDICATION TOMORROW. DC PLAN IS HOME. PATIENT WILL NEED RX FOR OUTPATIENT SPEECH THERAPY.   CARE TEAM FOLLOWING

## 2019-04-12 NOTE — PROGRESS NOTES
Physical Therapy Med Surg Initial Assessment  Facility/Department: [facility]  Room: N224/N224-01       NAME: Juliana Byrne  : 1932 (80 y.o.)  MRN: [id number]  CODE STATUS: Full Code    Date of Service: 2019    Patient Diagnosis(es): Acute CVA (cerebrovascular accident) [facility]) [I63.9]   Chief Complaint   Patient presents with    Aphasia     Patient Active Problem List    Diagnosis Date Noted    Acute CVA (cerebrovascular accident) ([facility] [address] 2019        Past Medical History:   Diagnosis Date    Atrial fibrillation ([facility] [address]     [facility]      Past Surgical History:   Procedure Laterality Date    PACEMAKER PLACEMENT         Chart Reviewed: Yes  Patient assessed for rehabilitation services?: Yes  Family / Caregiver Present: Yes(spouse, dtr)  General Comment  Comments: Pt awake in bed, agreeable to PT eval    Restrictions:  Restrictions/Precautions: Fall Risk     SUBJECTIVE:    Pre Treatment Pain Screening  Pain at present: 0    Post Treatment Pain Screening:   Pain Screening  Patient Currently in Pain: Denies    Prior Level of Function:  Social/Functional History  Lives With: Spouse  Type of Home: House  Home Layout: One level(shower in basement (about 8 steps with 1 HR); bathtub/shower on first floor)  Home Access: Stairs to enter without rails  Entrance Stairs - Number of Steps: 2 then 1 step up to landing  Bathroom Shower/Tub: Walk-in shower  Bathroom Equipment: Grab bars in shower  ADL Assistance: Independent  Homemaking Assistance: Independent  Ambulation Assistance: Independent  Transfer Assistance: Independent  Active : Yes    OBJECTIVE:   Vision/Hearing:   Reports no changes in vision.     Cognition:  Overall Orientation Status: Within Functional Limits  Follows Commands: Within Functional Limits    Observation/Palpation  Observation: Mild right sided facial droop noted, increased effort to speak and enunciate     ROM:  RLE AROM: WFL  LLE AROM : WFL    Strength:  Strength RLE  Strength RLE: WFL  Strength LLE  Strength LLE: WFL    Neuro:  Balance  Sitting - Static: Good  Sitting - Dynamic: Good  Standing - Static: Good  Standing - Dynamic: Good             Bed mobility  Supine to Sit: Independent  Sit to Supine: Independent    Transfers  Sit to Stand: Independent  Stand to sit: Independent  Bed to Chair: Independent    Ambulation  Ambulation?: Yes  Ambulation 1  Surface: level tile  Device: No Device  Assistance: Modified Independent  Quality of Gait: WFL  Distance: 150 ft  Comments: challenged with head turns, sudden starts/stops, perturbations, changes in velocity and no LOB     Activity Tolerance  Activity Tolerance: Patient Tolerated treatment well          ASSESSMENT:   Decision Making: Low Complexity  History: medium  Exam: low  Clinical Presentation: low  No Skilled PT: Independent with functional mobility     Prognosis: Excellent    DISCHARGE RECOMMENDATIONS:  No Skilled PT: Independent with functional mobility     Pt is independent with all functional mobility. Pt states that he has no d/c needs or concerns for safe return home with PRN assistance from spouse. All symptoms located to face and mouth and would benefit from outpt f/u (whether PT or SLP) to return to OF. No skilled PT needs identified at this time. Please re-consult if there is a change in functional mobility status.       REQUIRES PT FOLLOW UP: No      Goals:   No PT goals identified    [facility] (6 CLICK) BASIC MOBILITY  AM-PAC Inpatient Mobility Raw Score : 24     Therapy Time:   Individual   Time In 0935   Time Out 0949   Minutes [address], [place], 04/12/19 at 10:39 AM

## 2019-04-12 NOTE — FLOWSHEET NOTE
56- Shift assessment completed at this time. Pt is A&OX4. Does experience expressive aphasia with slurred speech and right facial droop. Pt strong in all extremities and can ambulate independently. Pt is on Heparin drip at 13 units/kg/hr per protocol. Will continue to monitor. Cardiology consulted for MRI and defibrillator compatibility. 1300- Pt transported down to MRI. 1500- Dr. Arturo Gutiérrez in to evaluate pt. D/C Heparin drip, started Eliquis PO.    Electronically signed by Sudheer Blake RN on 4/12/19 at 7:11 PM

## 2019-04-12 NOTE — CONSULTS
Consult Note  Patient: Rhonda Sanders  Unit/Bed:  S309/U399-08  YOB: 1932  MRN: [id number]  Acct: [de-identified]   Admitting Diagnosis: Acute CVA (cerebrovascular accident) [facility]) [I63.9]  Admit Date:  4/11/2019  Hospital Day: 1      Chief Complaint:  CVA    History of Present Illness:  80-year-old male came into the emergency department yesterday for expressive aphasia. Symptoms have since resolved. Apparently he had very upsetting phone call was having difficulty speaking and  decided that they would like to do an MRI MRA of the brain. Scattered a history of an aortic valve replacement, and AICD, hypertension, dyslipidemia in the past.  He denies chest pain he is able to answer questions at this time I examined an echo done.   No fever, chills, nausea, vomiting, PND, orthopnea, claudications    Allergies   Allergen Reactions    Morphine      hallucinations         Current Facility-Administered Medications   Medication Dose Route Frequency Provider Last Rate Last Dose    heparin (porcine) injection 6,350 Units  80 Units/kg Intravenous PRN JESSE Naylor CNP        heparin (porcine) injection 3,150 Units  40 Units/kg Intravenous PRN JESSE Naylor CNP        heparin 25,000 units in dextrose 5% 250 mL infusion  18 Units/kg/hr Intravenous Continuous JESSE Naylor CNP 10.3 mL/hr at 04/12/19 0720 13 Units/kg/hr at 04/12/19 0720    sodium chloride flush 0.9 % injection 10 mL  10 mL Intravenous 2 times per day JESSE Naylor CNP   10 mL at 04/12/19 5857    sodium chloride flush 0.9 % injection 10 mL  10 mL Intravenous PRN JESSE Naylor CNP        magnesium hydroxide (MILK OF MAGNESIA) 400 MG/5ML suspension 30 mL  30 mL Oral Daily PRN JESSE Naylor CNP        ondansetron (ZOFRAN) injection 4 mg  4 mg Intravenous Q6H PRN JESSE Naylor CNP        aspirin EC tablet 81 mg  81 mg Oral Daily JESSE Naylor CNP   81 mg at 04/12/19 Constitutional: Negative for appetite change, chills, diaphoresis, fatigue and fever. HENT: Negative. Eyes: Negative. Respiratory: Negative for apnea, cough, choking, chest tightness, shortness of breath, wheezing and stridor. Cardiovascular: Negative for chest pain, palpitations and leg swelling. Gastrointestinal: Negative. Endocrine: Negative. Genitourinary: Negative. Musculoskeletal: Negative. Allergic/Immunologic: Negative. Neurological: Positive for speech difficulty. Hematological: Negative. Psychiatric/Behavioral: Negative. Physical Examination:    BP (!) 146/68   Pulse 61   Temp 98.2 °F (36.8 °C) (Oral)   Resp 18   Ht 5' 3\" (1.6 m)   Wt 169 lb 1.6 oz (76.7 kg)   SpO2 97%   BMI 29.95 kg/m²    Physical Exam   Constitutional: He is oriented to person, place, and time. He appears well-developed and well-nourished. HENT:   Head: Normocephalic. Eyes: Pupils are equal, round, and reactive to light. Neck: No JVD present. No tracheal deviation present. No thyromegaly present. Cardiovascular: Normal rate, regular rhythm and intact distal pulses. Exam reveals no gallop and no friction rub. Murmur heard. Pulmonary/Chest: Effort normal and breath sounds normal. No respiratory distress. He has no wheezes. He has no rales. He exhibits no tenderness. Abdominal: Soft. Bowel sounds are normal.   Musculoskeletal: Normal range of motion. He exhibits no edema or tenderness. Lymphadenopathy:     He has no cervical adenopathy. Neurological: He is alert and oriented to person, place, and time. Skin: Skin is warm and dry. Psychiatric: He has a normal mood and affect.        LABS:  CBC:  Lab Results   Component Value Date    WBC 5.5 04/12/2019    RBC 5.09 04/12/2019    HGB 11.2 04/12/2019    HCT 35.2 04/12/2019    MCV 69.2 04/12/2019    MCH 22.0 04/12/2019    MCHC 31.8 04/12/2019    RDW 16.7 04/12/2019     04/12/2019     CBC with Differential:   Lab Results Component Value Date    WBC 5.5 04/12/2019    RBC 5.09 04/12/2019    HGB 11.2 04/12/2019    HCT 35.2 04/12/2019     04/12/2019    MCV 69.2 04/12/2019    MCH 22.0 04/12/2019    MCHC 31.8 04/12/2019    RDW 16.7 04/12/2019    LYMPHOPCT 41.8 04/11/2019    MONOPCT 8.9 04/11/2019    BASOPCT 0.7 04/11/2019    MONOSABS 0.5 04/11/2019    LYMPHSABS 2.5 04/11/2019    EOSABS 0.1 04/11/2019    BASOSABS 0.0 04/11/2019     CMP:    Lab Results   Component Value Date     04/12/2019    K 3.9 04/12/2019     04/12/2019    CO2 23 04/12/2019    BUN 14 04/12/2019    CREATININE 0.66 04/12/2019    GFRAA >60.0 04/12/2019    LABGLOM >60.0 04/12/2019    GLUCOSE 91 04/12/2019    CALCIUM 8.3 04/12/2019     BMP:    Lab Results   Component Value Date     04/12/2019    K 3.9 04/12/2019     04/12/2019    CO2 23 04/12/2019    BUN 14 04/12/2019    CREATININE 0.66 04/12/2019    CALCIUM 8.3 04/12/2019    GFRAA >60.0 04/12/2019    LABGLOM >60.0 04/12/2019    GLUCOSE 91 04/12/2019     Magnesium:  No results found for: MG  Troponin:    Lab Results   Component Value Date    TROPONINI <0.010 04/12/2019       EKG: PACED      Assessment:    Active Hospital Problems    Diagnosis Date Noted    Acute CVA (cerebrovascular accident) ([facility] [address] [I63.9] 04/11/2019      acute CVA  PAF  Hx AICD  Hx AVR  HTN  dyslipidemia      Plan:  1. Tele monitoring  2. AICD interrogation at 1 pm  3. Continue heparin drip  4. Monitor magnesium levels and keep greater 2.0  5. Monitor potassium levels and keep greater than 4.0  6. Thank you for allowing me to participate in the care of your patient, please don't hesitate to contact me if you have any further questions.             Electronically signed by JESSE Cerda CNP on 4/12/2019 at 11:28 AM

## 2019-04-13 LAB
ANION GAP SERPL CALCULATED.3IONS-SCNC: 11 MEQ/L (ref 9–15)
BUN BLDV-MCNC: 12 MG/DL (ref 8–23)
CALCIUM SERPL-MCNC: 8.9 MG/DL (ref 8.5–9.9)
CHLORIDE BLD-SCNC: 102 MEQ/L (ref 95–107)
CO2: 25 MEQ/L (ref 20–31)
CREAT SERPL-MCNC: 0.84 MG/DL (ref 0.7–1.2)
GFR AFRICAN AMERICAN: >60
GFR NON-AFRICAN AMERICAN: >60
GLUCOSE BLD-MCNC: 101 MG/DL (ref 70–99)
HCT VFR BLD CALC: 37.3 % (ref 42–52)
HEMOGLOBIN: 12 G/DL (ref 14–18)
MCH RBC QN AUTO: 22.6 PG (ref 27–31.3)
MCHC RBC AUTO-ENTMCNC: 32.2 % (ref 33–37)
MCV RBC AUTO: 70.2 FL (ref 80–100)
PDW BLD-RTO: 16.6 % (ref 11.5–14.5)
PLATELET # BLD: 123 K/UL (ref 130–400)
POTASSIUM REFLEX MAGNESIUM: 5.6 MEQ/L (ref 3.4–4.9)
RBC # BLD: 5.31 M/UL (ref 4.7–6.1)
SODIUM BLD-SCNC: 138 MEQ/L (ref 135–144)
WBC # BLD: 5.4 K/UL (ref 4.8–10.8)

## 2019-04-13 PROCEDURE — 99233 SBSQ HOSP IP/OBS HIGH 50: CPT | Performed by: INTERNAL MEDICINE

## 2019-04-13 PROCEDURE — 6370000000 HC RX 637 (ALT 250 FOR IP): Performed by: NURSE PRACTITIONER

## 2019-04-13 PROCEDURE — 6370000000 HC RX 637 (ALT 250 FOR IP): Performed by: INTERNAL MEDICINE

## 2019-04-13 PROCEDURE — 1210000000 HC MED SURG R&B

## 2019-04-13 PROCEDURE — 36415 COLL VENOUS BLD VENIPUNCTURE: CPT

## 2019-04-13 PROCEDURE — 6370000000 HC RX 637 (ALT 250 FOR IP): Performed by: PSYCHIATRY & NEUROLOGY

## 2019-04-13 PROCEDURE — 85027 COMPLETE CBC AUTOMATED: CPT

## 2019-04-13 PROCEDURE — 80048 BASIC METABOLIC PNL TOTAL CA: CPT

## 2019-04-13 PROCEDURE — 2580000003 HC RX 258: Performed by: NURSE PRACTITIONER

## 2019-04-13 RX ORDER — SODIUM POLYSTYRENE SULFONATE 15 G/60ML
15 SUSPENSION ORAL; RECTAL ONCE
Status: COMPLETED | OUTPATIENT
Start: 2019-04-13 | End: 2019-04-13

## 2019-04-13 RX ADMIN — CARVEDILOL 12.5 MG: 12.5 TABLET, FILM COATED ORAL at 16:23

## 2019-04-13 RX ADMIN — SACUBITRIL AND VALSARTAN 1 TABLET: 24; 26 TABLET, FILM COATED ORAL at 07:59

## 2019-04-13 RX ADMIN — CARVEDILOL 12.5 MG: 12.5 TABLET, FILM COATED ORAL at 07:59

## 2019-04-13 RX ADMIN — APIXABAN 5 MG: 5 TABLET, FILM COATED ORAL at 07:59

## 2019-04-13 RX ADMIN — APIXABAN 5 MG: 5 TABLET, FILM COATED ORAL at 21:49

## 2019-04-13 RX ADMIN — ASPIRIN 81 MG: 81 TABLET, COATED ORAL at 07:59

## 2019-04-13 RX ADMIN — SACUBITRIL AND VALSARTAN 1 TABLET: 24; 26 TABLET, FILM COATED ORAL at 21:49

## 2019-04-13 RX ADMIN — Medication 10 ML: at 07:59

## 2019-04-13 RX ADMIN — ATORVASTATIN CALCIUM 40 MG: 40 TABLET, FILM COATED ORAL at 21:49

## 2019-04-13 RX ADMIN — SODIUM POLYSTYRENE SULFONATE 15 G: 15 SUSPENSION ORAL; RECTAL at 12:47

## 2019-04-13 RX ADMIN — Medication 10 ML: at 21:48

## 2019-04-13 ASSESSMENT — ENCOUNTER SYMPTOMS
EYES NEGATIVE: 1
CHEST TIGHTNESS: 0
BLOOD IN STOOL: 0
WHEEZING: 0
DIARRHEA: 0
RESPIRATORY NEGATIVE: 1
VOMITING: 0
COUGH: 0
SHORTNESS OF BREATH: 0
STRIDOR: 0
NAUSEA: 0
GASTROINTESTINAL NEGATIVE: 1

## 2019-04-13 ASSESSMENT — PAIN SCALES - GENERAL: PAINLEVEL_OUTOF10: 0

## 2019-04-13 NOTE — PROGRESS NOTES
Progress Note  Patient: Jet Ramon  Unit/Bed: F816/S551-31  YOB: 1932  MRN: [id number]  Acct: [de-identified]   Admitting Diagnosis: Acute CVA (cerebrovascular accident) [facility]) [I63.9]  Admit Date:  4/11/2019  Hospital Day: 2    Chief Complaint: AF CVA    Histories:  Past Medical History:   Diagnosis Date    Atrial fibrillation ([address]     Colon cancer [facility])      Past Surgical History:   Procedure Laterality Date    PACEMAKER PLACEMENT       History reviewed. No pertinent family history. Social History     Socioeconomic History    Marital status:      Spouse name: None    Number of children: None    Years of education: None    Highest education level: None   Occupational History    None   Social Needs    Financial resource strain: None    Food insecurity:     Worry: None     Inability: None    Transportation needs:     Medical: None     Non-medical: None   Tobacco Use    Smoking status: Never Smoker    Smokeless tobacco: Never Used   Substance and Sexual Activity    Alcohol use: Yes     Comment: glass of wine daily     Drug use: None    Sexual activity: None   Lifestyle    Physical activity:     Days per week: None     Minutes per session: None    Stress: None   Relationships    Social connections:     Talks on phone: None     Gets together: None     Attends Protestant service: None     Active member of club or organization: None     Attends meetings of clubs or organizations: None     Relationship status: None    Intimate partner violence:     Fear of current or ex partner: None     Emotionally abused: None     Physically abused: None     Forced sexual activity: None   Other Topics Concern    None   Social History Narrative    None       Subjective/HPI getting stronger. Speech little better eating. Wants to go for walk. Family in room    EKG: V Paced with underlying AF        Review of Systems:   Review of Systems   Constitutional: Negative.   Negative for diaphoresis and fatigue. HENT: Negative. Eyes: Negative. Respiratory: Negative. Negative for cough, chest tightness, shortness of breath, wheezing and stridor. Cardiovascular: Negative. Negative for chest pain, palpitations and leg swelling. Gastrointestinal: Negative. Negative for blood in stool and nausea. Genitourinary: Negative. Musculoskeletal: Negative. Skin: Negative. Neurological: Positive for speech difficulty. Negative for dizziness, syncope, weakness and light-headedness. Hematological: Negative. Psychiatric/Behavioral: Negative. Physical Examination:    BP (!) 148/87   Pulse 62   Temp 97.3 °F (36.3 °C)   Resp 16   Ht 5' 3\" (1.6 m)   Wt 169 lb 1.6 oz (76.7 kg)   SpO2 99%   BMI 29.95 kg/m²    Physical Exam   Constitutional: He appears healthy. No distress. HENT:   Normal cephalic and Atraumatic   Eyes: Pupils are equal, round, and reactive to light. Neck: Normal range of motion and thyroid normal. Neck supple. No JVD present. No neck adenopathy. No thyromegaly present. Cardiovascular: Normal rate, regular rhythm, normal heart sounds, intact distal pulses and normal pulses. Pulses:       Carotid pulses are on the right side with bruit, and on the left side with bruit. Pulmonary/Chest: Effort normal and breath sounds normal. He has no wheezes. He has no rales. He exhibits no tenderness. Abdominal: Soft. Bowel sounds are normal. There is no tenderness. Musculoskeletal: Normal range of motion. He exhibits no edema or tenderness. Neurological: He is alert and oriented to person, place, and time. Skin: Skin is warm. No cyanosis. Nails show no clubbing.        LABS:  CBC:   Lab Results   Component Value Date    WBC 5.4 04/13/2019    RBC 5.31 04/13/2019    HGB 12.0 04/13/2019    HCT 37.3 04/13/2019    MCV 70.2 04/13/2019    MCH 22.6 04/13/2019    MCHC 32.2 04/13/2019    RDW 16.6 04/13/2019     04/13/2019     CBC with Differential:    Lab Results   Component Value Date    WBC 5.4 04/13/2019    RBC 5.31 04/13/2019    HGB 12.0 04/13/2019    HCT 37.3 04/13/2019     04/13/2019    MCV 70.2 04/13/2019    MCH 22.6 04/13/2019    MCHC 32.2 04/13/2019    RDW 16.6 04/13/2019    LYMPHOPCT 41.8 04/11/2019    MONOPCT 8.9 04/11/2019    BASOPCT 0.7 04/11/2019    MONOSABS 0.5 04/11/2019    LYMPHSABS 2.5 04/11/2019    EOSABS 0.1 04/11/2019    BASOSABS 0.0 04/11/2019     CMP:    Lab Results   Component Value Date     04/13/2019    K 5.6 04/13/2019     04/13/2019    CO2 25 04/13/2019    BUN 12 04/13/2019    CREATININE 0.84 04/13/2019    GFRAA >60.0 04/13/2019    LABGLOM >60.0 04/13/2019    GLUCOSE 101 04/13/2019    CALCIUM 8.9 04/13/2019     BMP:    Lab Results   Component Value Date     04/13/2019    K 5.6 04/13/2019     04/13/2019    CO2 25 04/13/2019    BUN 12 04/13/2019    CREATININE 0.84 04/13/2019    CALCIUM 8.9 04/13/2019    GFRAA >60.0 04/13/2019    LABGLOM >60.0 04/13/2019    GLUCOSE 101 04/13/2019     Magnesium:  No results found for: MG  Troponin:    Lab Results   Component Value Date    TROPONINI <0.010 04/12/2019        Active Hospital Problems    Diagnosis Date Noted    Acute ischemic stroke [facility]) [I63.9]      Priority: High    Acute CVA (cerebrovascular accident) [address] [address] [I63.9] 04/11/2019     Priority: Low        Assessment/Plan:  1. [place] [facility]  2. Will plan for ANA Monday  3. Hyperkalemia- Kayexalate given. Possibly hemolyzed. 4. Review CUS  5. New AF- Pt is awaiting for Eliquis ordered by [facility] via [facility].  Has not started yet. Eliquis is off lable usage due to TAVR. Discussed with Pt sand family. 6. BiV- ICD  7.  NICM EF 43%             Electronically signed by Carlotta Recinos MD on 4/13/2019 at 12:33 PM

## 2019-04-13 NOTE — CARE COORDINATION
PATIENT IS FROM HOME W/ WIFE. PATIENT IS INDEPENDENT. PATIENT IS TO START ELIQUIS AT HOME ON D/C. PATIENT'S WIFE HAS RX TO BE DELIVERED AT HOME PER C3 NOTE. PATIENT DECLINES ANY NEEDS BUT IS SCHEDULED FOR A ANA ON Monday. PATIENT WILL STAY UNTIL ANA CAN BE PERFORMED PER ADMITTING MEDICAL PHYSICIAN. LSW/C3 TO CONTINUE TO FOLLOW AND MONITOR FOR ANY D/C NEEDS OR CHANGES TO THE D/C PLAN.

## 2019-04-13 NOTE — PROGRESS NOTES
Kiet Blancas is a 80 y.o. male patient. PT was seen and evaluated, feeling better    Current Facility-Administered Medications   Medication Dose Route Frequency Provider Last Rate Last Dose    sodium polystyrene (KAYEXALATE) 15 GM/60ML suspension 15 g  15 g Oral Once Fe Herdeia MD        apixaban (ELIQUIS) tablet 5 mg  5 mg Oral BID Anurag Quick MD   5 mg at 04/13/19 0759    sodium chloride flush 0.9 % injection 10 mL  10 mL Intravenous 2 times per day Lexy Bouillon APRN - CNP   10 mL at 04/13/19 0759    sodium chloride flush 0.9 % injection 10 mL  10 mL Intravenous PRN Lexy Bouillon, APRN - CNP        magnesium hydroxide (MILK OF MAGNESIA) 400 MG/5ML suspension 30 mL  30 mL Oral Daily PRN Lexy Bouillon, APRN - CNP        ondansetron (ZOFRAN) injection 4 mg  4 mg Intravenous Q6H PRN Lexy Bouillon, APRMICA - CNP        aspirin EC tablet 81 mg  81 mg Oral Daily [facility] BouigaetanoonJESSE - CNP   81 mg at 04/13/19 0759    atorvastatin (LIPITOR) tablet 40 mg  40 mg Oral Nightly Fe Heredia MD   40 mg at 04/12/19 2016    hydrALAZINE (APRESOLINE) injection 10 mg  10 mg Intravenous Q6H PRN Fe Heredia MD        carvedilol (COREG) tablet 12.5 mg  12.5 mg Oral BID  Braden Mitchell MD   12.5 mg at 04/13/19 0759    sacubitril-valsartan (ENTRESTO) 24-26 MG per tablet 1 tablet  1 tablet Oral BID Braden Mitchell MD   1 tablet at 04/13/19 0759     Allergies   Allergen Reactions    Morphine      hallucinations       Active Problems:    Acute ischemic stroke [facility])    Acute CVA (cerebrovascular accident) ([facility] [address]  Resolved Problems:    * No resolved hospital problems. *    Blood pressure (!) 148/87, pulse 62, temperature 97.3 °F (36.3 °C), resp. rate 16, height 5' 3\" (1.6 m), weight 169 lb 1.6 oz (76.7 kg), SpO2 99 %. Subjective:  Symptoms:  He reports malaise and weakness. No shortness of breath, cough, chest pain, headache, chest pressure, anorexia, diarrhea or anxiety. Diet:  No nausea or vomiting. Objective:  General Appearance: In no acute distress, well-appearing and comfortable. Vital signs: (most recent): Blood pressure (!) 148/87, pulse 62, temperature 97.3 °F (36.3 °C), resp. rate 16, height 5' 3\" (1.6 m), weight 169 lb 1.6 oz (76.7 kg), SpO2 99 %. HEENT: Normal HEENT exam.    Lungs:  Normal effort and normal respiratory rate. Breath sounds clear to auscultation. Heart: Normal rate. Regular rhythm. S1 normal.    Abdomen: Abdomen is soft. Bowel sounds are normal.     Neurological: Patient is alert. Skin:  Warm and dry.       Assessment & Plan  1) cardio embolic CVA  Need ANA  FU neuro  2) hyperkalemia  homar  Spoke to cardiology about possible SE of Entresto  3) HTN stable  4) afib rate controlled  Meagan Layton MD  4/13/2019

## 2019-04-13 NOTE — PROGRESS NOTES
Progress Note  NEUROLOGY  MLOZ 2N Neuro       Patient: [facility]  Unit/Bed: P570/W073-93  YOB: 1932  MRN: [id number]  Acct: [de-identified]   Admitting Diagnosis: Acute CVA (cerebrovascular accident) [facility]) [I63.9]  Admit Date:  4/11/2019  Hospital Day: 2    Subjective:      Patient Seen, Chart, Labs, Radiology studies, and Consults reviewed. Objective:   BP (!) 148/87   Pulse 63   Temp 97.3 °F (36.3 °C)   Resp 16   Ht 5' 3\" (1.6 m)   Wt 169 lb 1.6 oz (76.7 kg)   SpO2 99%   BMI 29.95 kg/m²   No intake or output data in the 24 hours ending 04/13/19 1702    Physical Exam:  GENERAL APPEARANCE: No distress, alert, interactive and cooperative. MENTAL STATE: Orientation was normal to time, place and person. Language testing was normal for comprehension, repetition, expression, and naming. General fund of knowledge was intact. CRANIAL NERVES: Are normal  CN 2 Visual fields full to confrontation. CN 3, 4, 6 Pupils round, equally reactive to light. No ptosis. EOMs normal alignment, full range with normal saccades, pursuit and convergence. No nystagmus. CN 5 Facial sensation intact bilaterally. CN 7 Normal and symmetric facial strength. Mild bluntening of rihgt nasolabial fold. CN 8 Hearing intact to finger rub bilaterally. CN 9 Palate elevates symmetrically. CN 11 Bilaterally normal strength of shoulder shrug and neck turning. CN 12 Tongue midline, with normal bulk and strength; no fasciculations. MOTOR:Muscle strength was 5/5 in distal and proximal muscles in both upper and lower extremities. No fasciculations, tremor or other abnormal movements were present. REFLEXES: Unchanged    SENSORY: Sensory exam was unchanged     COORDINATION: Coordination exam was normal. In both upper extremities, finger-nose-finger was intact without dysmetria.       Medications:    apixaban  5 mg Oral BID    sodium chloride flush  10 mL Intravenous 2 times per day    aspirin  81 mg Oral Daily    atorvastatin  40 mg Oral Nightly    carvedilol  12.5 mg Oral BID     sacubitril-valsartan  1 tablet Oral BID     Continuous Infusions:  PRN Meds:sodium chloride flush, magnesium hydroxide, ondansetron, hydrALAZINE    LABS  CBC:   Recent Labs     04/11/19  1800 04/12/19  0526 04/13/19  0547   WBC 5.9 5.5 5.4   HGB 12.2* 11.2* 12.0*    119* 123*     BMP:    Recent Labs     04/11/19  1800 04/12/19  0526 04/13/19  0547    135 138   K 4.5 3.9 5.6*   CL 98 101 102   CO2 25 23 25   BUN 16 14 12   CREATININE 0.88 0.66* 0.84   GLUCOSE 89 91 101*     TSH:  No results for input(s): TSH in the last 72 hours. B12:  No results for input(s): Deepika Eduardo in the last 72 hours. Vit. D: No results for input(s): VITD25 in the last 72 hours. Lipids:   Lab Results   Component Value Date    CHOL 138 04/12/2019     Lab Results   Component Value Date    TRIG 52 04/12/2019     Lab Results   Component Value Date    HDL 45 04/12/2019     Lab Results   Component Value Date    LDLCALC 83 04/12/2019     No results found for: LABVLDL, VLDL  No results found for: CHOLHDLRATIO    Ammonia:No results for input(s): AMMONIA in the last 72 hours. LFT: No results for input(s): AST, ALT, ALB, BILITOT, ALKPHOS in the last 72 hours. Urine: No results for input(s): Braxton Lords, GLUCOSEU, BLOODU, PHUR, PROTEINU, UROBILINOGEN, LEUKOCYTESUR in the last 72 hours. 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 Now on eliquis and aspirin, mechanism is afib.    ANA   PT/OT/ST    Electronically signed by Kathi Redmond MD on 4/13/2019 at 5:02 PM

## 2019-04-14 LAB
ANION GAP SERPL CALCULATED.3IONS-SCNC: 9 MEQ/L (ref 9–15)
BUN BLDV-MCNC: 14 MG/DL (ref 8–23)
CALCIUM SERPL-MCNC: 8.9 MG/DL (ref 8.5–9.9)
CHLORIDE BLD-SCNC: 100 MEQ/L (ref 95–107)
CO2: 28 MEQ/L (ref 20–31)
CREAT SERPL-MCNC: 0.98 MG/DL (ref 0.7–1.2)
GFR AFRICAN AMERICAN: >60
GFR NON-AFRICAN AMERICAN: >60
GLUCOSE BLD-MCNC: 101 MG/DL (ref 70–99)
HCT VFR BLD CALC: 35.7 % (ref 42–52)
HEMOGLOBIN: 11.5 G/DL (ref 14–18)
MCH RBC QN AUTO: 22.5 PG (ref 27–31.3)
MCHC RBC AUTO-ENTMCNC: 32.1 % (ref 33–37)
MCV RBC AUTO: 69.9 FL (ref 80–100)
PDW BLD-RTO: 16.4 % (ref 11.5–14.5)
PLATELET # BLD: 122 K/UL (ref 130–400)
POTASSIUM REFLEX MAGNESIUM: 5 MEQ/L (ref 3.4–4.9)
RBC # BLD: 5.11 M/UL (ref 4.7–6.1)
SODIUM BLD-SCNC: 137 MEQ/L (ref 135–144)
WBC # BLD: 5.4 K/UL (ref 4.8–10.8)

## 2019-04-14 PROCEDURE — 6370000000 HC RX 637 (ALT 250 FOR IP): Performed by: PSYCHIATRY & NEUROLOGY

## 2019-04-14 PROCEDURE — 80048 BASIC METABOLIC PNL TOTAL CA: CPT

## 2019-04-14 PROCEDURE — 6370000000 HC RX 637 (ALT 250 FOR IP): Performed by: INTERNAL MEDICINE

## 2019-04-14 PROCEDURE — 2580000003 HC RX 258: Performed by: NURSE PRACTITIONER

## 2019-04-14 PROCEDURE — 85027 COMPLETE CBC AUTOMATED: CPT

## 2019-04-14 PROCEDURE — 6370000000 HC RX 637 (ALT 250 FOR IP): Performed by: NURSE PRACTITIONER

## 2019-04-14 PROCEDURE — 36415 COLL VENOUS BLD VENIPUNCTURE: CPT

## 2019-04-14 PROCEDURE — 99232 SBSQ HOSP IP/OBS MODERATE 35: CPT | Performed by: INTERNAL MEDICINE

## 2019-04-14 PROCEDURE — 1210000000 HC MED SURG R&B

## 2019-04-14 RX ADMIN — Medication 10 ML: at 08:02

## 2019-04-14 RX ADMIN — APIXABAN 5 MG: 5 TABLET, FILM COATED ORAL at 20:43

## 2019-04-14 RX ADMIN — CARVEDILOL 12.5 MG: 12.5 TABLET, FILM COATED ORAL at 08:02

## 2019-04-14 RX ADMIN — ATORVASTATIN CALCIUM 40 MG: 40 TABLET, FILM COATED ORAL at 20:43

## 2019-04-14 RX ADMIN — APIXABAN 5 MG: 5 TABLET, FILM COATED ORAL at 08:02

## 2019-04-14 RX ADMIN — ASPIRIN 81 MG: 81 TABLET, COATED ORAL at 08:02

## 2019-04-14 RX ADMIN — SACUBITRIL AND VALSARTAN 1 TABLET: 24; 26 TABLET, FILM COATED ORAL at 20:44

## 2019-04-14 RX ADMIN — SACUBITRIL AND VALSARTAN 1 TABLET: 24; 26 TABLET, FILM COATED ORAL at 08:02

## 2019-04-14 RX ADMIN — CARVEDILOL 12.5 MG: 12.5 TABLET, FILM COATED ORAL at 17:37

## 2019-04-14 RX ADMIN — Medication 10 ML: at 20:44

## 2019-04-14 ASSESSMENT — ENCOUNTER SYMPTOMS
DIARRHEA: 0
SHORTNESS OF BREATH: 0
CHEST TIGHTNESS: 0
GASTROINTESTINAL NEGATIVE: 1
RESPIRATORY NEGATIVE: 1
NAUSEA: 0
STRIDOR: 0
BLOOD IN STOOL: 0
WHEEZING: 0
VOMITING: 0
EYES NEGATIVE: 1
COUGH: 0

## 2019-04-14 NOTE — PROGRESS NOTES
Melisa Yin is a 80 y.o. male patient. PT was seen and evaluated, feeling better, going for ANA on Monday    Current Facility-Administered Medications   Medication Dose Route Frequency Provider Last Rate Last Dose    apixaban (ELIQUIS) tablet 5 mg  5 mg Oral BID Charline Jay MD   5 mg at 04/14/19 0802    sodium chloride flush 0.9 % injection 10 mL  10 mL Intravenous 2 times per day Yvonnie Postin, APRN - CNP   10 mL at 04/14/19 0802    sodium chloride flush 0.9 % injection 10 mL  10 mL Intravenous PRN Yvonnie Postin, APRN - CNP        magnesium hydroxide (MILK OF MAGNESIA) 400 MG/5ML suspension 30 mL  30 mL Oral Daily PRN Yvonnie Postin, APRN - CNP        ondansetron (ZOFRAN) injection 4 mg  4 mg Intravenous Q6H PRN Yvonnie Postin, APRN - CNP        aspirin EC tablet 81 mg  81 mg Oral Daily Yvonnie Postin, APRN - CNP   81 mg at 04/14/19 0802    atorvastatin (LIPITOR) tablet 40 mg  40 mg Oral Nightly Sim Borges MD   40 mg at 04/13/19 2149    hydrALAZINE (APRESOLINE) injection 10 mg  10 mg Intravenous Q6H PRN Sim Borges MD        carvedilol (COREG) tablet 12.5 mg  12.5 mg Oral BID WC Amy Fofana MD   12.5 mg at 04/14/19 0802    sacubitril-valsartan (ENTRESTO) 24-26 MG per tablet 1 tablet  1 tablet Oral BID Amy Fofana MD   1 tablet at 04/14/19 0802     Allergies   Allergen Reactions    Morphine      hallucinations       Active Problems:    Acute ischemic stroke [facility])    Acute CVA (cerebrovascular accident) ([facility] [address]  Resolved Problems:    * No resolved hospital problems. *    Blood pressure (!) 154/86, pulse 113, temperature 97.2 °F (36.2 °C), temperature source Oral, resp. rate 18, height 5' 3\" (1.6 m), weight 169 lb 1.6 oz (76.7 kg), SpO2 97 %. Subjective:  Symptoms:  He reports malaise and weakness. No shortness of breath, cough, chest pain, headache, chest pressure, anorexia, diarrhea or anxiety. Diet:  No nausea or vomiting. Objective:  General Appearance:   In no acute

## 2019-04-14 NOTE — CARE COORDINATION
Spoke to patient and wife and other family members @ bedside. Plan remains to return home w/ outpatient speech therapy. No additional needs at discharge. Per Dr. Jame Valdez, if ANA normal tomorrow, patient may discharge to home. Reviewed IMM with patient and wife. Voiced understanding.

## 2019-04-14 NOTE — PROGRESS NOTES
Progress Note  Patient: Shanon Caballero  Unit/Bed: J696/G302-61  YOB: 1932  MRN: [id number]  Acct: [de-identified]   Admitting Diagnosis: Acute CVA (cerebrovascular accident) [facility]) [I63.9]  Admit Date:  4/11/2019  Hospital Day: 3    Chief Complaint: AF CVA    Histories:  Past Medical History:   Diagnosis Date    Atrial fibrillation ([address]     Colon cancer [facility])      Past Surgical History:   Procedure Laterality Date    PACEMAKER PLACEMENT       History reviewed. No pertinent family history. Social History     Socioeconomic History    Marital status:      Spouse name: None    Number of children: None    Years of education: None    Highest education level: None   Occupational History    None   Social Needs    Financial resource strain: None    Food insecurity:     Worry: None     Inability: None    Transportation needs:     Medical: None     Non-medical: None   Tobacco Use    Smoking status: Never Smoker    Smokeless tobacco: Never Used   Substance and Sexual Activity    Alcohol use: Yes     Comment: glass of wine daily     Drug use: None    Sexual activity: None   Lifestyle    Physical activity:     Days per week: None     Minutes per session: None    Stress: None   Relationships    Social connections:     Talks on phone: None     Gets together: None     Attends Anabaptism service: None     Active member of club or organization: None     Attends meetings of clubs or organizations: None     Relationship status: None    Intimate partner violence:     Fear of current or ex partner: None     Emotionally abused: None     Physically abused: None     Forced sexual activity: None   Other Topics Concern    None   Social History Narrative    None       Subjective/HPI up walking in room no cp no sob residual mild speech deficit    EKG: V Paced with underlying AF        Review of Systems:   Review of Systems   Constitutional: Negative. Negative for diaphoresis and fatigue. HENT: Negative. Eyes: Negative. Respiratory: Negative. Negative for cough, chest tightness, shortness of breath, wheezing and stridor. Cardiovascular: Negative. Negative for chest pain, palpitations and leg swelling. Gastrointestinal: Negative. Negative for blood in stool and nausea. Genitourinary: Negative. Musculoskeletal: Negative. Skin: Negative. Neurological: Positive for speech difficulty. Negative for dizziness, syncope, weakness and light-headedness. Hematological: Negative. Psychiatric/Behavioral: Negative. Physical Examination:    BP (!) 154/86   Pulse 113   Temp 97.2 °F (36.2 °C) (Oral)   Resp 18   Ht 5' 3\" (1.6 m)   Wt 169 lb 1.6 oz (76.7 kg)   SpO2 97%   BMI 29.95 kg/m²    Physical Exam   Constitutional: He appears healthy. No distress. HENT:   Normal cephalic and Atraumatic   Eyes: Pupils are equal, round, and reactive to light. Neck: Normal range of motion and thyroid normal. Neck supple. No JVD present. No neck adenopathy. No thyromegaly present. Cardiovascular: Normal rate, regular rhythm, intact distal pulses and normal pulses. Murmur heard. Pulses:       Carotid pulses are on the right side with bruit, and on the left side with bruit. Pulmonary/Chest: Effort normal and breath sounds normal. He has no wheezes. He has no rales. He exhibits no tenderness. Abdominal: Soft. Bowel sounds are normal. There is no tenderness. Musculoskeletal: Normal range of motion. He exhibits no edema or tenderness. Neurological: He is alert and oriented to person, place, and time. Skin: Skin is warm. No cyanosis. Nails show no clubbing.        LABS:  CBC:   Lab Results   Component Value Date    WBC 5.4 04/14/2019    RBC 5.11 04/14/2019    HGB 11.5 04/14/2019    HCT 35.7 04/14/2019    MCV 69.9 04/14/2019    MCH 22.5 04/14/2019    MCHC 32.1 04/14/2019    RDW 16.4 04/14/2019     04/14/2019     CBC with Differential:    Lab Results   Component Value Date    WBC 5.4

## 2019-04-15 ENCOUNTER — APPOINTMENT (OUTPATIENT)
Dept: CARDIAC CATH/INVASIVE PROCEDURES | Age: 84
DRG: 065 | End: 2019-04-15
Payer: MEDICARE

## 2019-04-15 VITALS
TEMPERATURE: 97.2 F | HEART RATE: 60 BPM | RESPIRATION RATE: 18 BRPM | HEIGHT: 63 IN | BODY MASS INDEX: 29.96 KG/M2 | WEIGHT: 169.1 LBS | OXYGEN SATURATION: 97 % | DIASTOLIC BLOOD PRESSURE: 83 MMHG | SYSTOLIC BLOOD PRESSURE: 135 MMHG

## 2019-04-15 LAB
ANION GAP SERPL CALCULATED.3IONS-SCNC: 13 MEQ/L (ref 9–15)
BUN BLDV-MCNC: 14 MG/DL (ref 8–23)
CALCIUM SERPL-MCNC: 8.8 MG/DL (ref 8.5–9.9)
CHLORIDE BLD-SCNC: 99 MEQ/L (ref 95–107)
CO2: 26 MEQ/L (ref 20–31)
CREAT SERPL-MCNC: 0.87 MG/DL (ref 0.7–1.2)
GFR AFRICAN AMERICAN: >60
GFR AFRICAN AMERICAN: >60
GFR NON-AFRICAN AMERICAN: >60
GFR NON-AFRICAN AMERICAN: >60
GLUCOSE BLD-MCNC: 98 MG/DL (ref 70–99)
HCT VFR BLD CALC: 36.2 % (ref 42–52)
HEMOGLOBIN: 11.6 G/DL (ref 14–18)
INR BLD: 1
LV EF: 43 %
LVEF MODALITY: NORMAL
MCH RBC QN AUTO: 22.1 PG (ref 27–31.3)
MCHC RBC AUTO-ENTMCNC: 32 % (ref 33–37)
MCV RBC AUTO: 69.2 FL (ref 80–100)
PDW BLD-RTO: 16.6 % (ref 11.5–14.5)
PERFORMED ON: NORMAL
PERFORMED ON: NORMAL
PLATELET # BLD: 120 K/UL (ref 130–400)
POC CREATININE: 0.9 MG/DL (ref 0.8–1.3)
POC SAMPLE TYPE: NORMAL
POC SAMPLE TYPE: NORMAL
POTASSIUM REFLEX MAGNESIUM: 4.3 MEQ/L (ref 3.4–4.9)
PROTHROMBIN TIME, POC: 12.1 SEC (ref 9.5–12.5)
RBC # BLD: 5.23 M/UL (ref 4.7–6.1)
SODIUM BLD-SCNC: 138 MEQ/L (ref 135–144)
WBC # BLD: 5.2 K/UL (ref 4.8–10.8)

## 2019-04-15 PROCEDURE — 6370000000 HC RX 637 (ALT 250 FOR IP): Performed by: INTERNAL MEDICINE

## 2019-04-15 PROCEDURE — 80048 BASIC METABOLIC PNL TOTAL CA: CPT

## 2019-04-15 PROCEDURE — 93321 DOPPLER ECHO F-UP/LMTD STD: CPT

## 2019-04-15 PROCEDURE — 93312 ECHO TRANSESOPHAGEAL: CPT

## 2019-04-15 PROCEDURE — 6360000002 HC RX W HCPCS: Performed by: INTERNAL MEDICINE

## 2019-04-15 PROCEDURE — 85027 COMPLETE CBC AUTOMATED: CPT

## 2019-04-15 PROCEDURE — 93312 ECHO TRANSESOPHAGEAL: CPT | Performed by: INTERNAL MEDICINE

## 2019-04-15 PROCEDURE — B24BZZ4 ULTRASONOGRAPHY OF HEART WITH AORTA, TRANSESOPHAGEAL: ICD-10-PCS | Performed by: INTERNAL MEDICINE

## 2019-04-15 PROCEDURE — 2580000003 HC RX 258: Performed by: INTERNAL MEDICINE

## 2019-04-15 PROCEDURE — 93325 DOPPLER ECHO COLOR FLOW MAPG: CPT

## 2019-04-15 PROCEDURE — 36415 COLL VENOUS BLD VENIPUNCTURE: CPT

## 2019-04-15 RX ORDER — SODIUM CHLORIDE 0.9 % (FLUSH) 0.9 %
10 SYRINGE (ML) INJECTION EVERY 12 HOURS SCHEDULED
Status: DISCONTINUED | OUTPATIENT
Start: 2019-04-15 | End: 2019-04-15 | Stop reason: HOSPADM

## 2019-04-15 RX ORDER — MIDAZOLAM HYDROCHLORIDE 1 MG/ML
4 INJECTION INTRAMUSCULAR; INTRAVENOUS ONCE
Status: COMPLETED | OUTPATIENT
Start: 2019-04-15 | End: 2019-04-15

## 2019-04-15 RX ORDER — SODIUM CHLORIDE 9 MG/ML
INJECTION, SOLUTION INTRAVENOUS CONTINUOUS
Status: DISCONTINUED | OUTPATIENT
Start: 2019-04-15 | End: 2019-04-15 | Stop reason: HOSPADM

## 2019-04-15 RX ORDER — SODIUM CHLORIDE 0.9 % (FLUSH) 0.9 %
10 SYRINGE (ML) INJECTION PRN
Status: DISCONTINUED | OUTPATIENT
Start: 2019-04-15 | End: 2019-04-15 | Stop reason: HOSPADM

## 2019-04-15 RX ORDER — ASPIRIN 81 MG/1
81 TABLET ORAL DAILY
Qty: 30 TABLET | Refills: 3 | Status: SHIPPED | OUTPATIENT
Start: 2019-04-16 | End: 2019-04-15

## 2019-04-15 RX ORDER — FENTANYL CITRATE 50 UG/ML
100 INJECTION, SOLUTION INTRAMUSCULAR; INTRAVENOUS ONCE
Status: COMPLETED | OUTPATIENT
Start: 2019-04-15 | End: 2019-04-15

## 2019-04-15 RX ORDER — ATORVASTATIN CALCIUM 40 MG/1
40 TABLET, FILM COATED ORAL NIGHTLY
Qty: 30 TABLET | Refills: 3 | Status: SHIPPED | OUTPATIENT
Start: 2019-04-15 | End: 2019-04-15

## 2019-04-15 RX ORDER — ATORVASTATIN CALCIUM 40 MG/1
40 TABLET, FILM COATED ORAL NIGHTLY
Qty: 30 TABLET | Refills: 3 | Status: SHIPPED | OUTPATIENT
Start: 2019-04-15

## 2019-04-15 RX ORDER — ASPIRIN 81 MG/1
81 TABLET ORAL DAILY
Qty: 30 TABLET | Refills: 3 | Status: ON HOLD | OUTPATIENT
Start: 2019-04-16 | End: 2021-01-09

## 2019-04-15 RX ADMIN — SODIUM CHLORIDE: 9 INJECTION, SOLUTION INTRAVENOUS at 05:32

## 2019-04-15 RX ADMIN — CARVEDILOL 12.5 MG: 12.5 TABLET, FILM COATED ORAL at 14:41

## 2019-04-15 RX ADMIN — SODIUM CHLORIDE 10 ML: 9 INJECTION, SOLUTION INTRAMUSCULAR; INTRAVENOUS; SUBCUTANEOUS at 10:23

## 2019-04-15 RX ADMIN — ASPIRIN 81 MG: 81 TABLET, COATED ORAL at 14:40

## 2019-04-15 RX ADMIN — MIDAZOLAM HYDROCHLORIDE 1 MG: 1 INJECTION, SOLUTION INTRAMUSCULAR; INTRAVENOUS at 01:00

## 2019-04-15 RX ADMIN — Medication 10 ML: at 05:32

## 2019-04-15 RX ADMIN — LIDOCAINE HYDROCHLORIDE 15 ML: 20 SOLUTION ORAL; TOPICAL at 10:24

## 2019-04-15 RX ADMIN — FENTANYL CITRATE 50 MCG: 50 INJECTION, SOLUTION INTRAMUSCULAR; INTRAVENOUS at 10:22

## 2019-04-15 RX ADMIN — APIXABAN 5 MG: 5 TABLET, FILM COATED ORAL at 14:40

## 2019-04-15 RX ADMIN — SACUBITRIL AND VALSARTAN 1 TABLET: 24; 26 TABLET, FILM COATED ORAL at 14:40

## 2019-04-15 ASSESSMENT — ENCOUNTER SYMPTOMS
NAUSEA: 0
VOMITING: 0
ABDOMINAL DISTENTION: 0
TROUBLE SWALLOWING: 0
SHORTNESS OF BREATH: 0
ABDOMINAL PAIN: 0
CONSTIPATION: 0
COUGH: 0
CHEST TIGHTNESS: 0
COLOR CHANGE: 0
WHEEZING: 0
DIARRHEA: 0

## 2019-04-15 ASSESSMENT — PAIN SCALES - GENERAL: PAINLEVEL_OUTOF10: 0

## 2019-04-15 NOTE — DISCHARGE INSTR - COC
scale): Pain Level: 0  Last Weight:   Wt Readings from Last 1 Encounters:   19 169 lb 1.6 oz (76.7 kg)     Mental Status:  {IP PT MENTAL STATUS:95271}    IV Access:  { ARIES IV ACCESS:357291369}    Nursing Mobility/ADLs:  Walking   {CHP DME DJQD:937325370}  Transfer  {CHP DME AQLI:311792716}  Bathing  {CHP DME RXXN:224582245}  Dressing  {CHP DME ZYTY:103248333}  Toileting  {CHP DME JPZS:898526611}  Feeding  {CHP DME KTHH:819320722}  Med Admin  {CHP DME KJOF:027880988}  Med Delivery   { ARIES MED Delivery:724604414}    Wound Care Documentation and Therapy:        Elimination:  Continence:   · Bowel: {YES / WI:70796}  · Bladder: {YES / ZK:76549}  Urinary Catheter: {Urinary Catheter:612204333}   Colostomy/Ileostomy/Ileal Conduit: {YES / QO:62872}       Date of Last BM: ***    Intake/Output Summary (Last 24 hours) at 4/15/2019 1650  Last data filed at 4/15/2019 9304  Gross per 24 hour   Intake 325 ml   Output --   Net 325 ml     I/O last 3 completed shifts: In: 325 [P.O.:240;  I.V.:85]  Out: -     Safety Concerns:     [address] Safety Concerns:627220545}    Impairments/Disabilities:      [address] Impairments/Disabilities:548859687}    Nutrition Therapy:  Current Nutrition Therapy:   [address] Diet List:705847687}    Routes of Feeding: {CHP DME Other Feedings:058760721}  Liquids: {Slp liquid thickness:79958}  Daily Fluid Restriction: {CHP DME Yes amt example:647738673}  Last Modified Barium Swallow with Video (Video Swallowing Test): {Done Not Done THJQ:563271008}    Treatments at the Time of Hospital Discharge:   Respiratory Treatments: ***  Oxygen Therapy:  {Therapy; copd oxygen:66841}  Ventilator:    { CC Vent CWSX:421519563}    Rehab Therapies: {THERAPEUTIC INTERVENTION:0043210189}  Weight Bearing Status/Restrictions: [address] SARMIENTO Weight Bearin}  Other Medical Equipment (for information only, NOT a DME order):  {EQUIPMENT:554582346}  Other Treatments: ***    Patient's personal belongings (please select all

## 2019-04-15 NOTE — PROGRESS NOTES
Assessment done, no distress noted. Pt denies any pain. Resting in bed. Call light in reach.  Will monitor

## 2019-04-15 NOTE — DISCHARGE INSTR - DIET

## 2019-04-15 NOTE — PROGRESS NOTES
Neurology Daily Progress Note  Name: Anu Santos  Age: 80 y.o. Gender: male  CodeStatus: Full Code  Allergies: Morphine    Chief Complaint:Aphasia    Primary Care Provider: No primary care provider on file. InpatientTreatment Team: Treatment Team: Attending Provider: Janes Borrego MD; Consulting Physician: Sukhi Patel MD; Consulting Physician: Staci Summers MD; Registered Nurse: Jacqui Johnson RN; : Niranjan Alvarenga RN  Admission Date: 4/11/2019      HPI   Pt seen and examined for neuro follow up for CVA. A&O x3, NAD, pleasant and cooperative. Wife present for visit. Pt overall improved. Speech improving. Slight right facial droop. Dys[hagia improved. No won regular diet. No other focal deficits. Ambulating well. NO Headache, double vision, blurry vision, difficulty with swallowing, weakness, numbness, pain, nausea, vomiting, choking, neck pain, dizziness. Vitals:    04/15/19 0405   BP: 135/83   Pulse: 60   Resp: 18   Temp: 97.2 °F (36.2 °C)   SpO2: 97%        Physical Exam   Constitutional: He is oriented to person, place, and time. He appears well-nourished. No distress. HENT:   Head: Normocephalic and atraumatic. Eyes: Pupils are equal, round, and reactive to light. EOM are normal.   Neck: Neck supple. No JVD present. Cardiovascular: Normal rate and regular rhythm. Pulmonary/Chest: Effort normal and breath sounds normal. No respiratory distress. Abdominal: Soft. Bowel sounds are normal. He exhibits no distension. There is no tenderness. Musculoskeletal: He exhibits no edema. Neurological: He is alert and oriented to person, place, and time. Slight right facial droop  Dysarthria     Skin: Skin is warm and dry. No rash noted. He is not diaphoretic. Psychiatric: He has a normal mood and affect. Review of Systems   Constitutional: Negative for activity change, appetite change, chills, fatigue and fever. HENT: Negative for hearing loss and trouble swallowing. Eyes: Negative for visual disturbance. Respiratory: Negative for cough, chest tightness, shortness of breath and wheezing. Cardiovascular: Negative for chest pain, palpitations and leg swelling. Gastrointestinal: Negative for abdominal distention, abdominal pain, constipation, diarrhea, nausea and vomiting. Genitourinary: Negative for difficulty urinating. Musculoskeletal: Negative for gait problem. Skin: Negative for color change and rash. Neurological: Positive for facial asymmetry and speech difficulty. Negative for dizziness, tremors, seizures, syncope, weakness, light-headedness, numbness and headaches. Psychiatric/Behavioral: Negative for agitation, confusion and hallucinations. The patient is not nervous/anxious. Medications:  Reviewed    Infusion Medications:    sodium chloride 75 mL/hr at 04/15/19 0532     Scheduled Medications:    sodium chloride flush  10 mL Intravenous 2 times per day    apixaban  5 mg Oral BID    sodium chloride flush  10 mL Intravenous 2 times per day    aspirin  81 mg Oral Daily    atorvastatin  40 mg Oral Nightly    carvedilol  12.5 mg Oral BID WC    sacubitril-valsartan  1 tablet Oral BID     PRN Meds: sodium chloride flush, sodium chloride flush, magnesium hydroxide, ondansetron, hydrALAZINE    Labs:   Recent Labs     04/13/19  0547 04/14/19  0601 04/15/19  0552   WBC 5.4 5.4 5.2   HGB 12.0* 11.5* 11.6*   HCT 37.3* 35.7* 36.2*   * 122* 120*     Recent Labs     04/13/19  0547 04/14/19  0558 04/15/19  0552    137 138   K 5.6* 5.0* 4.3    100 99   CO2 25 28 26   BUN 12 14 14   CREATININE 0.84 0.98 0.87   CALCIUM 8.9 8.9 8.8     No results for input(s): AST, ALT, BILIDIR, BILITOT, ALKPHOS in the last 72 hours. No results for input(s): INR in the last 72 hours. No results for input(s): [place] in the last 72 hours.     Urinalysis:   No results found for: Kimberley Lango, [address], BACTERIA, RBCUA, BLOODU, [address], [address]    Radiology:   Most recent    Chest CT      WITH CONTRAST:No results found for this or any previous visit. WITHOUT CONTRAST: No results found for this or any previous visit. CXR      2-view: No results found for this or any previous visit. Portable:   Results for orders placed during the hospital encounter of 04/11/19   XR CHEST PORTABLE    Narrative EXAMINATION: XR CHEST PORTABLE    CLINICAL HISTORY:  stroke     COMPARISONS: 3/5/2009    FINDINGS: There is mild cardiomegaly. Pulmonary vascularity is normal. There is a left-sided ICD. 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 The lungs are clear. There is no pneumothorax. There are atherosclerotic changes of the thoracic aorta. There are degenerative changes of the spine and left shoulder. Impression NO ACUTE CHEST DISEASE. Echo No results found for this or any previous visit. 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 Dysarthria noted, improving. Dysphagia improved. Cont eliquis and aspirin, mechanism is afib. ANA no PFO of thrombus  PT/OT/ST  Out/pt speech eval    Tre MILENA Arenas MD, [place], American Board of Psychiatry & Neurology  Board Certified in Vascular Neurology  Board Certified in Neuromuscular Medicine  Certified in Neurorehabilitation         Collaborating physicians: Dr Shweta Arenas, Dr Awilda Neil, Dr Awilda Neil    Electronically signed by JESSE Kimbrough CNP on 4/15/2019 at 11:00 AM

## 2019-04-15 NOTE — BRIEF OP NOTE
Brief Postoperative Note  ______________________________________________________________    Patient: Kiet Blancas  YOB: 1932  MRN: [id number]   Section of Cardiology  Adult Brief Procedure Note        Procedure(s):  ANA with bubble study    Pre-operative Diagnosis:  CVA AF    H&P Status: Completed and reviewed. Post-operative Diagnosis:    LVEF 40-45%  TAVR site intact.  No AR  No Thrombus  No PFO    Findings: see full report    Complications:  none    Primary Proceduralist:   Arvind Jeter MD

## 2019-04-15 NOTE — PLAN OF CARE
Problem: Falls - Risk of:  Goal: Will remain free from falls  Description  Will remain free from falls  4/15/2019 1516 by Jorge Wallace RN  Outcome: Completed  4/15/2019 1516 by Jorge Wallace RN  Outcome: Met This Shift  Goal: Absence of physical injury  Description  Absence of physical injury  4/15/2019 1516 by Jorge Wallace RN  Outcome: Completed  4/15/2019 1516 by Jorge Wallace RN  Outcome: Met This Shift     Problem: HEMODYNAMIC STATUS  Goal: Patient has stable vital signs and fluid balance  4/15/2019 1516 by Jorge Wallace RN  Outcome: Completed  4/15/2019 1516 by Jorge Wallace RN  Outcome: Met This Shift     Problem: ACTIVITY INTOLERANCE/IMPAIRED MOBILITY  Goal: Mobility/activity is maintained at optimum level for patient  4/15/2019 1516 by Jorge Wallace RN  Outcome: Completed  4/15/2019 1516 by Jorge Wallace RN  Outcome: Met This Shift     Problem: COMMUNICATION IMPAIRMENT  Goal: Ability to express needs and understand communication  4/15/2019 1516 by Jorge Wallace RN  Outcome: Completed  4/15/2019 1516 by Jorge Wallace RN  Outcome: Met This Shift     Problem: IP SWALLOWING  Goal: LTG - patient will tolerate the least restrictive diet consistency to allow for safe consumption of daily meals  4/15/2019 1516 by Jorge Wallace RN  Outcome: Completed  4/15/2019 1516 by Jorge Wallace RN  Outcome: Met This Shift     Problem: IP COMMUNICATION/DYSARTHRIA  Goal: LTG - patient will improve expressive language skills to allow for communication of wants and needs in daily activities  4/15/2019 1516 by Jorge Wallace RN  Outcome: Completed  4/15/2019 1516 by Jorge Wallace RN  Outcome: Met This Shift

## 2019-04-17 NOTE — PROGRESS NOTES
Progress Note  NEUROLOGY  [facility] 2N Neuro       Patient: Pat Castillo  Unit/Bed: N985/L838-34  YOB: 1932  MRN: [id number]  Acct: [de-identified]   Admitting Diagnosis: Acute CVA (cerebrovascular accident) [facility]) [I63.9]  Admit Date:  4/11/2019    Subjective:    Patient continues to do well, his wife and other family members are at bedside. Patient Seen, Chart, Labs, Radiology studies, and Consults reviewed. Objective:   Vital signs were reviewed on day of service    Physical Exam:  GENERAL APPEARANCE: No distress, alert, interactive and cooperative. MENTAL STATE: Orientation was normal to time, place and person. Language testing was normal for comprehension, repetition, expression, and naming. General fund of knowledge was intact. CRANIAL NERVES: Are normal  CN 2 Visual fields full to confrontation. CN 3, 4, 6 Pupils round, equally reactive to light. No ptosis. EOMs normal alignment, full range with normal saccades, pursuit and convergence. No nystagmus. CN 5 Facial sensation intact bilaterally. CN 7 mild right facial weakness, very subtle  CN 8 Hearing intact to finger rub bilaterally. CN 9 Palate elevates symmetrically. CN 11 Bilaterally normal strength of shoulder shrug and neck turning. CN 12 Tongue midline, with normal bulk and strength; no fasciculations. MOTOR:Muscle strength was 5/5 in distal and proximal muscles in both upper and lower extremities. No fasciculations, tremor or other abnormal movements were present. REFLEXES: Unchanged    SENSORY: Sensory exam was unchanged     COORDINATION: Coordination exam was normal. In both upper extremities, finger-nose-finger was intact without dysmetria. LABS  Labs were reviewed on day of service      TSH:  No results for input(s): TSH in the last 72 hours. B12:  No results for input(s): Colette Avinash in the last 72 hours. Vit. D: No results for input(s): VITD25 in the last 72 hours.    Lipids:   Lab Results   Component Value Date    CHOL 138 04/12/2019     Lab Results   Component Value Date    TRIG 52 04/12/2019     Lab Results   Component Value Date    HDL 45 04/12/2019     Lab Results   Component Value Date    LDLCALC 83 04/12/2019     No results found for: LABVLDL, VLDL  No results found for: CHOLHDLRATIO    Ammonia:No results for input(s): AMMONIA in the last 72 hours. LFT: No results for input(s): AST, ALT, ALB, BILITOT, ALKPHOS in the last 72 hours. Urine: No results for input(s): Marcine Leyland, GLUCOSEU, BLOODU, PHUR, PROTEINU, UROBILINOGEN, LEUKOCYTESUR in the last 72 hours. Invalid input(s):  Neledis Jean,  SPECGRAV,  NITRU     Assessment/Plan:  Left hemispheric embolic infarcts with some PCA stenosis. Now on eliquis and aspirin, mechanism is afib.    ANA planned for Monday, patient improving   PT/OT/ST    Electronically signed by Lucero Rodgers MD

## 2019-04-23 ENCOUNTER — HOSPITAL ENCOUNTER (OUTPATIENT)
Dept: SPEECH THERAPY | Age: 84
Setting detail: THERAPIES SERIES
Discharge: HOME | End: 2019-04-23
Payer: MEDICARE

## 2019-04-23 PROCEDURE — 92523 SPEECH SOUND LANG COMPREHEN: CPT

## 2019-04-23 NOTE — PROGRESS NOTES
SPEECH/LANGUAGE PATHOLOGY  SPEECH/LANGUAGE/COGNITIVE EVALUATION      PATIENT NAME:  Mel Winslow. :  1932      TODAY'S DATE:  2019  ADMITTING DIAGNOSIS: Aphasia [R47.01]  Ordering Physician: Dr.Dhruv MILENA Chamberlain DX: Dysarthria(R47.8) Cognitive deficits (R47.89)  ACTIVE PROBLEM LIST:   Patient Active Problem List   Diagnosis    [facility] [address] [facility]    Acute ischemic stroke ([facility] [address]       [x]The admitting diagnosis and active problem list, as listed below have been reviewed prior to initiation of this evaluation. SPEECH PATHOLOGY DIAGNOSIS:      THERAPY RECOMMENDATIONS:   []Speech Pathology intervention is not warranted at this time. [x]Speech Pathology intervention is recommended with emphasis on the  following: Dysarthria, Cognition.                  MOTOR SPEECH    Oral Peripheral Examination   []Adequate lingual/labial strength   []Generalized oral weakness   [x]Right labiobuccal weakness, mild  []Left labiobuccal weakness   []Right lingual deviation    []Left lingual deviation   []Inadequate velopharyngeal closure  []Oral apraxia      []CNT    Parameters of Speech Production  Respiration:  [x]WFL []SOB []Inadequate for speech production  Articulation:  []WFL [x]Distortions []Anticipatory struggle []CNT  Resonance:  [x]WFL []Hypernasal  []Hyponasal  []Nasal emission []CNT  Quality:   [x]WFL []Hoarse []Harsh []Strained [] Breathiness []CNT  Pitch:    [x]WFL []High []Low []CNT  Intensity: [x]WFL []Loud []Quiet []CNT  Fluency:  [x]Intact []Dysfluent []CNT  Prosody [x]Intact []Monotone []Irregular fluctuation      RECEPTIVE LANGUAGE    Comprehension of Yes/No Questions:   [x]WNL    []Incomplete []Latent    []Inconsistent   []Perseveration []Cueing   []Unable    []CNT   Comment:[id number]/15    Process  Simple Verbal Commands:   [x]WNL     []Incomplete  []Latent   []Inconsistent   []Perseveration  []Cueing      []Unable []CNT  Comment: BDAE: Commands 100%  Process Intermediate Verbal Commands:   [x]WNL        []Incomplete[]Latent           []Inconsistent  []Perseveration    []Cueing      []Unable          []CNT  Comment:   Process Complex Verbal Commands:   [x]WNL        []Incomplete []Latent           []Inconsistent  []Perseveration    []Cueing       []Unable          []CNT   Comment:   Comprehension of Conversation:     [x] WNL        []Incomplete []Latent  []Inconsistent    []Perseveration  []Cueing     []Unable []CNT    Comment:  READING COMPREHENSION: BDAE 100%  EXPRESSIVE LANGUAGE   Automatics: [x]Functional [] Impaired  [] Not Tested    Imitation:  Words:         []Functional [] Impaired  [x]Not Tested    Sentences:  []Functional [] Impaired  [x] Not Tested   Comments:     Naming:  Modality used: [x]Verbal        []Written   Confrontation Naming: [x]Functional  [] Impaired  [] Not Tested  Functional Description: []Functional [] Impaired   [] Not Tested  Response Naming:       [x]Functional [] Impaired   [] Not Tested   Comment: 3/3, 3/3, 3/3. Conversation:     [x]Grammatical form: [] Intact []Disordered [] Not Tested   [x]Paraphasias: Occasional []Literal errors []Semantic errors [] Neologistic errors       []Running jargon []Anomia    Writing: Patient formulated a written sentence from the word \"before\" without difficulty.     COGNITION   Attention/Orientation  Attention: [x]Sustained attention []Easily distracted [] Not Tested  Orientation:  [x]Person  []Place [x]Date [x]Reason  for hospitalization    Memory   Biographical:  [x]Address  [x]Birthdate  [x] Age  [x]Family [] Not Tested  Delayed recall:  []Chair  []Cup  []Grass [x]Did not recall  [] Not Tested  Comment:     Organization/Problem Solving/Reasoning   Sequencing:   []Verbal [] Functional [] Impaired  [x] Not Tested  Comment:      Problem solving:    [x]Verbal task [x] Functional [] Impaired  [] Not Tested   Comment: 5/5  Mathematics:   [x]Simple arithmetic: []Functional [] Impaired  [] Not Tested   [x]Function based: []Functional  [] Impaired  [] Not Tested    CLINICAL OBSERVATIONS NOTED DURING THE EVALUATION  []Latent  responses   []Inconsistent responses   []Perseveration errors   []Anomic errors   []Paraphasic errors   []Cueing was required      Long Term Goals: 1. Patient to achieve expressive speech/cognitive skills to maximum potential.  2.Engage in conversational speech with maximum articulatory accuracy. Short Term Goals:  Pt to be seen 2x/wk for 4weeks  1. Patient to increase  labial strength and contact for accurate articulation of labial placement phonemes independently. 2. Patient to use compensatory strategies: decrease rate and over articulate to improve speech intelligibility independently from ~70-90%. 3. Patient to produce accurate and intelligible speech from ~70-90%. 4. Further test memory as needed. 5. Test sequencing. [x]  Prognosis for improvements is: Good       family support/communication and motivation  Pain:noneN/A      [x]  Patient stated goals: To speak better. [x]  Treatment goals discussed with: [x]  patient/  []  family. The [x]  patient/ []  family understand the diagnosis, prognosis and plan of care. MODIFIED HORTON FALL RISK ASSESSMENT:    History of Falling (has patient fallen in the past 30 days?):    No (0 points)  Secondary Diagnosis (is there more than 1 medical diagnosis in patients medical history?):    Yes (15 points)  Ambulatory Aid:    No device is used (0 points)  Gait:    Normal/bedrest/wheelchair (0 points)  Mental Status:    Oriented to own ability (0 points)  Total points =  15  Fall Risk Level: low  0 - 24: Low Risk - implement low risk fall prevention interventions    25 - 44: Medium risk  45 and higher: High Risk  Time in: 0800  Time out: 0900      Electronically signed by ANTONIA Mcarthur on 4/23/2019 at 4:50 PM      Dear Dr. Kiko Lynn.  Aubree Alvarado. has been evaluated for Speech Therapy services and for therapy to

## 2019-04-25 ENCOUNTER — HOSPITAL ENCOUNTER (OUTPATIENT)
Dept: SPEECH THERAPY | Age: 84
Setting detail: THERAPIES SERIES
Discharge: HOME | End: 2019-04-25
Payer: MEDICARE

## 2019-04-25 PROCEDURE — 92507 TX SP LANG VOICE COMM INDIV: CPT

## 2019-04-25 NOTE — PROGRESS NOTES
[facility]  Speech Language/Pathology  Out-Patient Adult Daily Note    Nimesh Hairston.  11/19/1932 4/25/2019    Visit Information:  SLP Insurance Information: Medicare  Total # of Visits Approved: 24  Total # of Visits to Date: 2  No Show: 0  Canceled Appointment: 0    Time in: 0800 Time out:  0900      Plan of care signed (Y/N):   [] yes  [] no  [x] faxed    Progress Note Next due by: Visit #10     Pt being seen for : [x] Speech/Language Treatment  [] Dysphagia Treatment             [] Cognitive Treatment  [] Other:    Subjective:  Behavior: [x] Alert  [x] Cooperative  []  Pleasant  [] Confused  [] Agitated                       [] Uncooperative  [] Distractible [x] Motivated  [] Self-Limiting [] Anxious  [] Other:    Endurance:  [x] Adequate for participation in SLP sessions  [] Reduced overall    [] Lethargic  [] Other:  Safety: [x] No concerns at this time  [] Reduced insight into deficits  []  Reduced safety awareness [] Other:     Objective/Assessment:   Patient progressing towards goals: 1. Long Term Goals: 1. Patient to achieve expressive speech/cognitive skills to maximum potential.  2.Engage in conversational speech with maximum articulatory accuracy. Short Term Goals:  Pt to be seen 2x/wk for 4weeks  1. Patient to increase  labial strength and contact for accurate articulation of labial placement phonemes independently. Patient completed the exercises 10 x each with min cues. 2. Patient to use compensatory strategies: decrease rate and over articulate to improve speech intelligibility independently from ~70-90%. Patient read  3. Patient to produce accurate and intelligible speech from ~70-90%. Patient instructed to increase oral opening when speaking to improve speech accuracy/intelligibility  4. Further test memory as needed. 5. Tested written sequencing. 4/6  67%. 6.Patient to complete sequencing tasks from 67% to 80%.     [] Pt demonstrated no s/s of pain during this visit.  none  N/A    Plan:  [x] Continue as per plan of care  [] Prepare for Discharge  [] Discharge      Patient/Caregiver Education:  [x] Patient/Caregiver Educated on session and progression towards goals. [x] Home exercise program: Patient given sequencing and reading aloud  [x] Patient/Caregiver stated verbal understanding of directions.     Signature:     Electronically signed by ANTONIA Mcknight on 4/25/2019 at 9:10 AM

## 2019-04-30 ENCOUNTER — HOSPITAL ENCOUNTER (OUTPATIENT)
Dept: SPEECH THERAPY | Age: 84
Setting detail: THERAPIES SERIES
Discharge: HOME | End: 2019-04-30
Payer: MEDICARE

## 2019-04-30 PROCEDURE — 92507 TX SP LANG VOICE COMM INDIV: CPT

## 2019-05-07 ENCOUNTER — HOSPITAL ENCOUNTER (OUTPATIENT)
Dept: SPEECH THERAPY | Age: 84
Setting detail: THERAPIES SERIES
Discharge: HOME | End: 2019-05-07
Payer: MEDICARE

## 2019-05-07 PROCEDURE — 92507 TX SP LANG VOICE COMM INDIV: CPT

## 2019-05-07 NOTE — PROGRESS NOTES
[address]  Speech Language/Pathology  Out-Patient Adult Daily Note    Rayshawn Rodriguezin.  11/19/1932 5/7/2019    Visit Information:  SLP Insurance Information: Medicare  Total # of Visits Approved: 24  Total # of Visits to Date: 4     Canceled Appointment: 0    Time in: 0800 Time out:  9305  Certification [id number]. Plan of care signed (Y/N):   [x] yes  [] no  [x] faxed    Progress Note Next due by: Visit #10     Pt being seen for : [x] Speech/Language Treatment  [] Dysphagia Treatment             [] Cognitive Treatment  [] Other:    Subjective:  Behavior: [x] Alert  [x] Cooperative  []  Pleasant  [] Confused  [] Agitated                       [] Uncooperative  [] Distractible [x] Motivated  [] Self-Limiting [] Anxious  [] Other:    Endurance:  [x] Adequate for participation in SLP sessions  [] Reduced overall    [] Lethargic  [] Other:  Safety: [x] No concerns at this time  [] Reduced insight into deficits  []  Reduced safety awareness [] Other:     Objective/Assessment:   Patient progressing towards goals: 1. Long Term Goals: 1. Patient to achieve expressive speech/cognitive skills to maximum potential.  2.Engage in conversational speech with maximum articulatory accuracy. Short Term Goals:  Pt to be seen 2x/wk for 4weeks  1. Patient to increase  labial strength and contact for accurate articulation of labial placement phonemes independently. Patient completed   2. Patient to use compensatory strategies: decrease rate and over articulate to improve speech intelligibility independently from ~70-90%. Patient read aloud at paragraph with   good articulation and rate  90%. 3. Patient to produce accurate and intelligible speech from ~70-90%. Patient's conversational speech is intelligible 90% of the time. 4. Further test memory as needed. 5.Patient to complete sequencing tasks from 67% to 80%. Goal met at 100%.   [] Pt demonstrated no s/s of pain during this

## 2019-05-09 ENCOUNTER — HOSPITAL ENCOUNTER (OUTPATIENT)
Dept: SPEECH THERAPY | Age: 84
Setting detail: THERAPIES SERIES
Discharge: HOME | End: 2019-05-09
Payer: MEDICARE

## 2019-05-09 PROCEDURE — 92507 TX SP LANG VOICE COMM INDIV: CPT

## 2019-05-09 NOTE — PROGRESS NOTES
sequence 6 written steps from 67%-90%. Goal met.       ACHIEVEMENT OF GOALS:    [x]Achieved all goals   []Achieved goals:           []Made progress towards goals:  []Did not achieve goals:  [] Unable to formally assess at this time   []Other:    REASON FOR DISCHARGE:  [x]Patient has achieved all goals  []Speech Therapy is no longer deemed necessary at this time  []Patients progress has plateaued at this time  [] Patient failed to keep scheduled appointments and has been discharged per      attendance policy  [] Patient has not been seen in the clinic since If additional therapy is desired, please send new prescription  [] Patient was released per physician request  []Other      DISCHARGE EDUCATION/RECOMMENDATIONS:  [x]Continue home exercise program as directed     []Refer back to physician for follow-up appointment     []None given at this time        Electronically signed by:  Trell Thomas M.A., CCC-SLP Date: 5/9/2019, 1:39 PM

## 2019-05-09 NOTE — PROGRESS NOTES
[address]  Speech Language/Pathology  Out-Patient Adult Daily Note    Azucena Hilton.  11/19/1932 5/9/2019    Visit Information:  SLP Insurance Information: Medicare  Total # of Visits Approved: 24  Total # of Visits to Date: 5  No Show: 0  Canceled Appointment: 0    Time in: 0800 Time out:  2946  Certification [id number]. Plan of care signed (Y/N):   [x] yes  [] no  [x] faxed    Progress Note Next due by: Visit #10     Pt being seen for : [x] Speech/Language Treatment  [] Dysphagia Treatment             [] Cognitive Treatment  [] Other:    Subjective:  Behavior: [x] Alert  [x] Cooperative  []  Pleasant  [] Confused  [] Agitated                       [] Uncooperative  [] Distractible [x] Motivated  [] Self-Limiting [] Anxious  [] Other:    Endurance:  [x] Adequate for participation in SLP sessions  [] Reduced overall    [] Lethargic  [] Other:  Safety: [x] No concerns at this time  [] Reduced insight into deficits  []  Reduced safety awareness [] Other:     Objective/Assessment:   Patient progressing towards goals: 1. Long Term Goals: 1. Patient to achieve expressive speech/cognitive skills to maximum potential.  2.Engage in conversational speech with maximum articulatory accuracy. Short Term Goals:  Pt to be seen 2x/wk for 4weeks  1. Patient to increase  labial strength and contact for accurate articulation of labial placement phonemes independently. Patient completed exercises independently. Labial ROM and strength improved. 2. Patient to use compensatory strategies: decrease rate and over articulate to improve speech intelligibility independently from ~70-90%. Patient read aloud from a book with  good articulation and rate  90%. 3. Patient to produce accurate and intelligible speech from ~70-90%. Patient's conversational speech is intelligible 90% of the time. Patient used slow rate and over articulation  4.  Further test memory as needed. Patient is doing well with daily STM. 5.Patient to complete sequencing tasks from 67% to 80%. Goal met at 100%. [x] Pt demonstrated no s/s of pain during this visit. none  N/A    Plan:  [x] Continue as per plan of care  [x] Prepare for Discharge  [x] Discharge      Patient/Caregiver Education:  [x] Patient/Caregiver Educated on session and progression towards goals. [x] Home exercise program: Patient to continue reading aloud at home and continte with labial exercises. [x] Patient/Caregiver stated verbal understanding of directions.     Signature:  Electronically signed by ANTONIA Au on 5/9/2019 at 1:28 PM

## 2019-05-14 ENCOUNTER — APPOINTMENT (OUTPATIENT)
Dept: SPEECH THERAPY | Age: 84
End: 2019-05-14
Payer: MEDICARE

## 2019-05-16 ENCOUNTER — APPOINTMENT (OUTPATIENT)
Dept: SPEECH THERAPY | Age: 84
End: 2019-05-16
Payer: MEDICARE

## 2019-05-21 ENCOUNTER — APPOINTMENT (OUTPATIENT)
Dept: SPEECH THERAPY | Age: 84
End: 2019-05-21
Payer: MEDICARE

## 2019-05-23 ENCOUNTER — APPOINTMENT (OUTPATIENT)
Dept: SPEECH THERAPY | Age: 84
End: 2019-05-23
Payer: MEDICARE

## 2019-05-28 ENCOUNTER — APPOINTMENT (OUTPATIENT)
Dept: SPEECH THERAPY | Age: 84
End: 2019-05-28
Payer: MEDICARE

## 2019-05-30 ENCOUNTER — APPOINTMENT (OUTPATIENT)
Dept: SPEECH THERAPY | Age: 84
End: 2019-05-30
Payer: MEDICARE

## 2020-01-06 ENCOUNTER — OFFICE VISIT (OUTPATIENT)
Dept: NEUROLOGY | Age: 85
End: 2020-01-06
Payer: MEDICARE

## 2020-01-06 VITALS
BODY MASS INDEX: 29.77 KG/M2 | DIASTOLIC BLOOD PRESSURE: 85 MMHG | HEIGHT: 63 IN | SYSTOLIC BLOOD PRESSURE: 125 MMHG | HEART RATE: 70 BPM | WEIGHT: 168 LBS | RESPIRATION RATE: 10 BRPM

## 2020-01-06 PROCEDURE — G8427 DOCREV CUR MEDS BY ELIG CLIN: HCPCS | Performed by: PSYCHIATRY & NEUROLOGY

## 2020-01-06 PROCEDURE — G8417 CALC BMI ABV UP PARAM F/U: HCPCS | Performed by: PSYCHIATRY & NEUROLOGY

## 2020-01-06 PROCEDURE — 99214 OFFICE O/P EST MOD 30 MIN: CPT | Performed by: PSYCHIATRY & NEUROLOGY

## 2020-01-06 PROCEDURE — 1036F TOBACCO NON-USER: CPT | Performed by: PSYCHIATRY & NEUROLOGY

## 2020-01-06 PROCEDURE — G8484 FLU IMMUNIZE NO ADMIN: HCPCS | Performed by: PSYCHIATRY & NEUROLOGY

## 2020-01-06 PROCEDURE — 4040F PNEUMOC VAC/ADMIN/RCVD: CPT | Performed by: PSYCHIATRY & NEUROLOGY

## 2020-01-06 PROCEDURE — 1123F ACP DISCUSS/DSCN MKR DOCD: CPT | Performed by: PSYCHIATRY & NEUROLOGY

## 2020-01-06 ASSESSMENT — ENCOUNTER SYMPTOMS
SHORTNESS OF BREATH: 0
TROUBLE SWALLOWING: 0
PHOTOPHOBIA: 0
NAUSEA: 0
CHOKING: 0
BACK PAIN: 0
VOMITING: 0

## 2020-01-06 NOTE — PROGRESS NOTES
Subjective:      Patient ID: Regina Nicholas. is a 80 y.o. male who presents today for:  Chief Complaint   Patient presents with   [employer] Cerebrovascular Accident     Patient presents today for a 6 month follow up in [facility] [address] 2019 for a mini stroke. Patient sttaes to be doing well since then and staying active daily. No new issues reported at this time. HPI 80years old gentlemen   with history of a left cerebral stroke. Patient presented with amnesia and actually did very well even without the PA. Patient was supposed to be on  Eliquis and aspirin when last seen. He has  reinstated his Eliquis and has  not any bleeding or bruising. His current ultrasound showed less than 50% stenosis. No recurrent symptoms of TIA are reported. Had left hemispheric cardioembolic infarcts in the PCA distribution with the PCA stenosis. Doing very well is not any bleeding or bruising. Is cognitively intact and has  slowed down in his activity of daily living but not cognitively.     Past Medical History:   Diagnosis Date    Atrial fibrillation ([facility] [address]     Colon cancer [facility])      Past Surgical History:   Procedure Laterality Date    PACEMAKER PLACEMENT       Social History     Socioeconomic History    Marital status:      Spouse name: Not on file    Number of children: Not on file    Years of education: Not on file    Highest education level: Not on file   Occupational History    Not on file   Social Needs    Financial resource strain: Not on file    Food insecurity:     Worry: Not on file     Inability: Not on file    Transportation needs:     Medical: Not on file     Non-medical: Not on file   Tobacco Use    Smoking status: Never Smoker    Smokeless tobacco: Never Used   Substance and Sexual Activity    Alcohol use: Yes     Comment: glass of wine daily     Drug use: Not on file    Sexual activity: Not on file   Lifestyle    Physical activity:     Days per week: Not on file     Minutes per session: Not on quite well is not developed any major cognitive issues. He does become occasionally irritable and this is just his frustration of not able to do what he was able to do before. We will continue keep observation of the same. He will  Let me know if there are any other issues. Plan:      No orders of the defined types were placed in this encounter. No orders of the defined types were placed in this encounter. Return in about 1 year (around 1/6/2021).       Nohemy Henriquez MD

## 2020-12-28 PROBLEM — Z95.2 S/P TAVR (TRANSCATHETER AORTIC VALVE REPLACEMENT): Status: ACTIVE | Noted: 2017-08-21

## 2020-12-28 PROBLEM — E87.1 LOW SODIUM LEVELS: Status: ACTIVE | Noted: 2017-08-24

## 2020-12-28 PROBLEM — I48.19 ATRIAL FIBRILLATION, PERSISTENT (HCC): Status: ACTIVE | Noted: 2019-07-30

## 2020-12-28 PROBLEM — I10 ESSENTIAL HYPERTENSION: Status: ACTIVE | Noted: 2020-12-28

## 2020-12-28 PROBLEM — R33.9 RETENTION OF URINE: Status: ACTIVE | Noted: 2017-08-25

## 2020-12-28 PROBLEM — I50.22 CHRONIC SYSTOLIC CHF (CONGESTIVE HEART FAILURE) (HCC): Status: ACTIVE | Noted: 2017-08-22

## 2020-12-28 PROBLEM — I35.0 NONRHEUMATIC AORTIC VALVE STENOSIS: Status: ACTIVE | Noted: 2017-08-21

## 2020-12-28 PROBLEM — Z96.649 S/P HIP REPLACEMENT: Status: ACTIVE | Noted: 2017-05-22

## 2020-12-28 PROBLEM — R30.0 DYSURIA: Status: ACTIVE | Noted: 2017-08-24

## 2020-12-28 PROBLEM — K76.9 HEPATIC LESION: Status: ACTIVE | Noted: 2018-08-08

## 2021-01-04 ENCOUNTER — OFFICE VISIT (OUTPATIENT)
Dept: NEUROLOGY | Age: 86
End: 2021-01-04
Payer: MEDICARE

## 2021-01-04 VITALS
BODY MASS INDEX: 29.76 KG/M2 | WEIGHT: 168 LBS | HEART RATE: 77 BPM | DIASTOLIC BLOOD PRESSURE: 80 MMHG | SYSTOLIC BLOOD PRESSURE: 132 MMHG

## 2021-01-04 DIAGNOSIS — F51.01 PRIMARY INSOMNIA: ICD-10-CM

## 2021-01-04 DIAGNOSIS — R41.3 MEMORY LOSS: ICD-10-CM

## 2021-01-04 DIAGNOSIS — I63.9 ACUTE ISCHEMIC STROKE (HCC): Primary | ICD-10-CM

## 2021-01-04 PROCEDURE — G8417 CALC BMI ABV UP PARAM F/U: HCPCS | Performed by: PSYCHIATRY & NEUROLOGY

## 2021-01-04 PROCEDURE — 1123F ACP DISCUSS/DSCN MKR DOCD: CPT | Performed by: PSYCHIATRY & NEUROLOGY

## 2021-01-04 PROCEDURE — 1036F TOBACCO NON-USER: CPT | Performed by: PSYCHIATRY & NEUROLOGY

## 2021-01-04 PROCEDURE — G8484 FLU IMMUNIZE NO ADMIN: HCPCS | Performed by: PSYCHIATRY & NEUROLOGY

## 2021-01-04 PROCEDURE — 4040F PNEUMOC VAC/ADMIN/RCVD: CPT | Performed by: PSYCHIATRY & NEUROLOGY

## 2021-01-04 PROCEDURE — G8427 DOCREV CUR MEDS BY ELIG CLIN: HCPCS | Performed by: PSYCHIATRY & NEUROLOGY

## 2021-01-04 PROCEDURE — 99214 OFFICE O/P EST MOD 30 MIN: CPT | Performed by: PSYCHIATRY & NEUROLOGY

## 2021-01-04 RX ORDER — NORTRIPTYLINE HYDROCHLORIDE 10 MG/1
10 CAPSULE ORAL NIGHTLY
Qty: 30 CAPSULE | Refills: 3 | Status: SHIPPED | OUTPATIENT
Start: 2021-01-04 | End: 2021-01-04

## 2021-01-04 RX ORDER — NORTRIPTYLINE HYDROCHLORIDE 10 MG/1
10 CAPSULE ORAL NIGHTLY
Qty: 30 CAPSULE | Refills: 3 | Status: ON HOLD | OUTPATIENT
Start: 2021-01-04 | End: 2021-01-16 | Stop reason: HOSPADM

## 2021-01-04 RX ORDER — LANOLIN ALCOHOL/MO/W.PET/CERES
3 CREAM (GRAM) TOPICAL DAILY
COMMUNITY

## 2021-01-04 RX ORDER — AMOXICILLIN 500 MG/1
CAPSULE ORAL
Status: ON HOLD | COMMUNITY
Start: 2020-10-08 | End: 2021-01-09 | Stop reason: ALTCHOICE

## 2021-01-04 RX ORDER — FUROSEMIDE 20 MG/1
TABLET ORAL
Status: ON HOLD | COMMUNITY
Start: 2020-12-16 | End: 2021-01-15 | Stop reason: SDUPTHER

## 2021-01-04 ASSESSMENT — ENCOUNTER SYMPTOMS
SHORTNESS OF BREATH: 0
BACK PAIN: 0
TROUBLE SWALLOWING: 0
PHOTOPHOBIA: 0
CHOKING: 0
COLOR CHANGE: 0
VOMITING: 0
NAUSEA: 0

## 2021-01-04 NOTE — PROGRESS NOTES
Subjective:      Patient ID: Richelle Galloway. is a 80 y.o. male who presents today for:  Chief Complaint   Patient presents with    Follow-up     Patient states that things are ok, He says he is always tired, and is not sleeping very good. He has been taking melatonin 3mg to try and help but says it does not work. Other wise he says things are going good. HPI 80-year-old right-handed gentleman with history of previous cerebrovascular event and a stroke. We have not seen him for a year and we will slipped a piece of paper from the family regarding patient not sleeping extremely tired depressed very angry there is question of dementia mention. He is on Eliquis without any bleeding or bruising is not any strokelike symptoms. Patient's family had some issues though I truly feel that this is a frustration of him not going out due to the Covid virus. He still ambulatory and functions very well.   MMSE was performed to the issues about his memory though he does well    Past Medical History:   Diagnosis Date    Atrial fibrillation ([facility] [address]     Colon cancer [facility])      Past Surgical History:   Procedure Laterality Date    PACEMAKER PLACEMENT       Social History     Socioeconomic History    Marital status:      Spouse name: Not on file    Number of children: Not on file    Years of education: Not on file    Highest education level: Not on file   Occupational History    Not on file   Social Needs    Financial resource strain: Not on file    Food insecurity     Worry: Not on file     Inability: Not on file    Transportation needs     Medical: Not on file     Non-medical: Not on file   Tobacco Use    Smoking status: Never Smoker    Smokeless tobacco: Never Used   Substance and Sexual Activity    Alcohol use: Yes     Comment: glass of wine daily     Drug use: Not on file    Sexual activity: Not on file   Lifestyle    Physical activity     Days per week: Not on file Minutes per session: Not on file    Stress: Not on file   Relationships    Social connections     Talks on phone: Not on file     Gets together: Not on file     Attends Jain service: Not on file     Active member of club or organization: Not on file     Attends meetings of clubs or organizations: Not on file     Relationship status: Not on file    Intimate partner violence     Fear of current or ex partner: Not on file     Emotionally abused: Not on file     Physically abused: Not on file     Forced sexual activity: Not on file   Other Topics Concern    Not on file   Social History Narrative    Not on file     No family history on file. Allergies   Allergen Reactions    Morphine      hallucinations      Tramadol Nausea And Vomiting     Constipation       Current Outpatient Medications   Medication Sig Dispense Refill    Ferrous Sulfate (IRON) 28 MG TABS TAKE 1 TAB BY MOUTH EVERY OTHER DAY      furosemide (LASIX) 20 MG tablet Take 1 tablet by mouth once daily.  octreotide ACETATE (SANDOSTATIN LAR) 20 MG injection INJECT 20MG INTRAMUSCULARLY EVERY 4 WEEKS      melatonin 3 MG TABS tablet Take 3 mg by mouth daily      nortriptyline (PAMELOR) 10 MG capsule Take 1 capsule by mouth nightly 30 capsule 3    apixaban (ELIQUIS) 5 MG TABS tablet Take 1 tablet by mouth 2 times daily 60 tablet 2    atorvastatin (LIPITOR) 40 MG tablet Take 1 tablet by mouth nightly 30 tablet 3    carvedilol (COREG) 12.5 MG tablet Take 12.5 mg by mouth 2 times daily (with meals)      sacubitril-valsartan (ENTRESTO) 24-26 MG per tablet Take 1 tablet by mouth 2 times daily      amoxicillin (AMOXIL) 500 MG capsule TAKE 4 CAPSULES BY MOUTH ONE HOUR PRIOR TO DENTAL APPOINTMENT.  aspirin 81 MG EC tablet Take 1 tablet by mouth daily (Patient not taking: Reported on 1/4/2021) 30 tablet 3     No current facility-administered medications for this visit. Review of Systems   Constitutional: Negative for fever. HENT: Negative for ear pain, tinnitus and trouble swallowing. Eyes: Negative for photophobia and visual disturbance. Respiratory: Negative for choking and shortness of breath. Cardiovascular: Negative for chest pain and palpitations. Gastrointestinal: Negative for nausea and vomiting. Musculoskeletal: Negative for back pain, gait problem, joint swelling, myalgias, neck pain and neck stiffness. Skin: Negative for color change. Allergic/Immunologic: Negative for food allergies. Neurological: Negative for dizziness, tremors, seizures, syncope, facial asymmetry, speech difficulty, weakness, light-headedness, numbness and headaches. Psychiatric/Behavioral: Negative for behavioral problems, confusion, hallucinations and sleep disturbance. Objective:   /80 (Site: Left Upper Arm, Position: Sitting, Cuff Size: Medium Adult)   Pulse 77   Wt 168 lb (76.2 kg)   BMI 29.76 kg/m²     Physical Exam  Vitals signs reviewed. Eyes:      Pupils: Pupils are equal, round, and reactive to light. Neck:      Musculoskeletal: Normal range of motion. Cardiovascular:      Rate and Rhythm: Normal rate and regular rhythm. Heart sounds: No murmur. Pulmonary:      Effort: Pulmonary effort is normal.      Breath sounds: Normal breath sounds. Abdominal:      General: Bowel sounds are normal.   Musculoskeletal: Normal range of motion. Skin:     General: Skin is warm. Neurological:      Mental Status: He is alert and oriented to person, place, and time. Cranial Nerves: No cranial nerve deficit. Sensory: No sensory deficit. Motor: No abnormal muscle tone. Coordination: Coordination normal.      Deep Tendon Reflexes: Reflexes are normal and symmetric. Babinski sign absent on the right side. Babinski sign absent on the left side. Psychiatric:         Mood and Affect: Mood normal.       MAC 26 and walks with a stooped posture  No results found.     Lab Results Component Value Date    WBC 5.2 04/15/2019    RBC 5.23 04/15/2019    HGB 11.6 04/15/2019    HCT 36.2 04/15/2019    MCV 69.2 04/15/2019    MCH 22.1 04/15/2019    MCHC 32.0 04/15/2019    RDW 16.6 04/15/2019     04/15/2019     Lab Results   Component Value Date     04/15/2019    K 4.3 04/15/2019    CL 99 04/15/2019    CO2 26 04/15/2019    BUN 14 04/15/2019    CREATININE 0.87 04/15/2019    GFRAA >60.0 04/15/2019    LABGLOM >60.0 04/15/2019    GLUCOSE 98 04/15/2019    CALCIUM 8.8 04/15/2019     Lab Results   Component Value Date    PROTIME 12.1 04/11/2019    INR 1.0 04/11/2019     No results found for: TSH, HDONUKOD19, FOLATE, FERRITIN, IRON, TIBC, PTRFSAT, TSH, FREET4  Lab Results   Component Value Date    TRIG 52 04/12/2019    HDL 45 04/12/2019    LDLCALC 83 04/12/2019     No results found for: Charlies Stands, LABBENZ, CANNAB, COCAINESCRN, LABMETH, OPIATESCREENURINE, PHENCYCLIDINESCREENURINE, PPXUR, ETOH  No results found for: LITHIUM, DILFRTOT, VALPROATE    Assessment:       Diagnosis Orders   1. Acute ischemic stroke ([facility] [address]     2. Primary insomnia     3. Memory loss     Left cerebral stroke with amnesia which is improved. Patient is on Eliquis and aspirin and he actually is doing quite well is not any symptoms of cerebral ischemia. The family is here as their questions which he was not a part of and reported that his not sleeping well is quite depressed very angry slurred words there is a question of dementia so we evaluated him for the same. He is MMSE 26 and this is age-related memory issues and not a dementia. Patient's insomnia secondary to him not feeling tired he does not go to the gym anymore and there is a lot of frustration and anger behaviors due to the Covid virus is stuck at home. Recommend we start him on Pamelor at night 10 mg to see if this helps with sleep as well as the mild depression that he has. Findings were discussed with the family the patient was not aware of the whole situation was what was brought to me as a family did not want him to know. Tre Bone MD, [place], American Board of Psychiatry & Neurology  Board Certified in Vascular Neurology  Board Certified in Neuromuscular Medicine  Certified in [facility]:      No orders of the defined types were placed in this encounter. Orders Placed This Encounter   Medications    nortriptyline (PAMELOR) 10 MG capsule     Sig: Take 1 capsule by mouth nightly     Dispense:  30 capsule     Refill:  3       Return in about 1 year (around 1/4/2022).       Pasha Irwin MD

## 2021-01-09 ENCOUNTER — APPOINTMENT (OUTPATIENT)
Dept: GENERAL RADIOLOGY | Age: 86
DRG: 291 | End: 2021-01-09
Payer: MEDICARE

## 2021-01-09 ENCOUNTER — HOSPITAL ENCOUNTER (INPATIENT)
Age: 86
LOS: 7 days | Discharge: HOME HEALTH | DRG: 291 | End: 2021-01-16
Attending: EMERGENCY MEDICINE | Admitting: INTERNAL MEDICINE
Payer: MEDICARE

## 2021-01-09 DIAGNOSIS — N17.9 ACUTE RENAL INJURY (HCC): ICD-10-CM

## 2021-01-09 DIAGNOSIS — I50.21 ACUTE SYSTOLIC CHF (CONGESTIVE HEART FAILURE) (HCC): ICD-10-CM

## 2021-01-09 DIAGNOSIS — T14.8XXA HEMATOMA: ICD-10-CM

## 2021-01-09 DIAGNOSIS — I50.9 ACUTE CONGESTIVE HEART FAILURE, UNSPECIFIED HEART FAILURE TYPE (HCC): Primary | ICD-10-CM

## 2021-01-09 PROBLEM — I50.31 ACUTE DIASTOLIC HEART FAILURE (HCC): Status: ACTIVE | Noted: 2021-01-09

## 2021-01-09 LAB
ALBUMIN SERPL-MCNC: 4.1 G/DL (ref 3.5–4.6)
ALP BLD-CCNC: 59 U/L (ref 35–104)
ALT SERPL-CCNC: 18 U/L (ref 0–41)
ANION GAP SERPL CALCULATED.3IONS-SCNC: 16 MEQ/L (ref 9–15)
APTT: 32.8 SEC (ref 24.4–36.8)
AST SERPL-CCNC: 34 U/L (ref 0–40)
BASOPHILS ABSOLUTE: 0 K/UL (ref 0–0.2)
BASOPHILS RELATIVE PERCENT: 0.3 %
BILIRUB SERPL-MCNC: 1.3 MG/DL (ref 0.2–0.7)
BUN BLDV-MCNC: 51 MG/DL (ref 8–23)
CALCIUM SERPL-MCNC: 9.1 MG/DL (ref 8.5–9.9)
CHLORIDE BLD-SCNC: 97 MEQ/L (ref 95–107)
CO2: 19 MEQ/L (ref 20–31)
CREAT SERPL-MCNC: 1.97 MG/DL (ref 0.7–1.2)
EKG ATRIAL RATE: 82 BPM
EKG Q-T INTERVAL: 428 MS
EKG QRS DURATION: 170 MS
EKG QTC CALCULATION (BAZETT): 517 MS
EKG R AXIS: -51 DEGREES
EKG T AXIS: 113 DEGREES
EKG VENTRICULAR RATE: 88 BPM
EOSINOPHILS ABSOLUTE: 0 K/UL (ref 0–0.7)
EOSINOPHILS RELATIVE PERCENT: 0.1 %
GFR AFRICAN AMERICAN: 39
GFR NON-AFRICAN AMERICAN: 32.2
GLOBULIN: 2.4 G/DL (ref 2.3–3.5)
GLUCOSE BLD-MCNC: 118 MG/DL (ref 70–99)
HCT VFR BLD CALC: 30.2 % (ref 42–52)
HEMOGLOBIN: 9.6 G/DL (ref 14–18)
INR BLD: 1.8
LV EF: 15 %
LVEF MODALITY: NORMAL
LYMPHOCYTES ABSOLUTE: 1.8 K/UL (ref 1–4.8)
LYMPHOCYTES RELATIVE PERCENT: 16.9 %
MCH RBC QN AUTO: 23.1 PG (ref 27–31.3)
MCHC RBC AUTO-ENTMCNC: 31.8 % (ref 33–37)
MCV RBC AUTO: 72.5 FL (ref 80–100)
MONOCYTES ABSOLUTE: 0.5 K/UL (ref 0.2–0.8)
MONOCYTES RELATIVE PERCENT: 4.6 %
NEUTROPHILS ABSOLUTE: 8.1 K/UL (ref 1.4–6.5)
NEUTROPHILS RELATIVE PERCENT: 78.1 %
PDW BLD-RTO: 16.9 % (ref 11.5–14.5)
PLATELET # BLD: 164 K/UL (ref 130–400)
POTASSIUM SERPL-SCNC: 4.8 MEQ/L (ref 3.4–4.9)
PRO-BNP: NORMAL PG/ML
PROTHROMBIN TIME: 21 SEC (ref 12.3–14.9)
RBC # BLD: 4.16 M/UL (ref 4.7–6.1)
SARS-COV-2, NAAT: NOT DETECTED
SODIUM BLD-SCNC: 132 MEQ/L (ref 135–144)
TOTAL CK: 183 U/L (ref 0–190)
TOTAL PROTEIN: 6.5 G/DL (ref 6.3–8)
TROPONIN: 0.03 NG/ML (ref 0–0.01)
WBC # BLD: 10.3 K/UL (ref 4.8–10.8)

## 2021-01-09 PROCEDURE — 93005 ELECTROCARDIOGRAM TRACING: CPT | Performed by: EMERGENCY MEDICINE

## 2021-01-09 PROCEDURE — 85025 COMPLETE CBC W/AUTO DIFF WBC: CPT

## 2021-01-09 PROCEDURE — 93306 TTE W/DOPPLER COMPLETE: CPT

## 2021-01-09 PROCEDURE — 99285 EMERGENCY DEPT VISIT HI MDM: CPT

## 2021-01-09 PROCEDURE — 96374 THER/PROPH/DIAG INJ IV PUSH: CPT

## 2021-01-09 PROCEDURE — 85610 PROTHROMBIN TIME: CPT

## 2021-01-09 PROCEDURE — 85730 THROMBOPLASTIN TIME PARTIAL: CPT

## 2021-01-09 PROCEDURE — 6360000002 HC RX W HCPCS: Performed by: EMERGENCY MEDICINE

## 2021-01-09 PROCEDURE — 83880 ASSAY OF NATRIURETIC PEPTIDE: CPT

## 2021-01-09 PROCEDURE — 6370000000 HC RX 637 (ALT 250 FOR IP): Performed by: INTERNAL MEDICINE

## 2021-01-09 PROCEDURE — 2580000003 HC RX 258: Performed by: INTERNAL MEDICINE

## 2021-01-09 PROCEDURE — 6360000002 HC RX W HCPCS: Performed by: INTERNAL MEDICINE

## 2021-01-09 PROCEDURE — 84484 ASSAY OF TROPONIN QUANT: CPT

## 2021-01-09 PROCEDURE — U0002 COVID-19 LAB TEST NON-CDC: HCPCS

## 2021-01-09 PROCEDURE — 80053 COMPREHEN METABOLIC PANEL: CPT

## 2021-01-09 PROCEDURE — 82550 ASSAY OF CK (CPK): CPT

## 2021-01-09 PROCEDURE — 36415 COLL VENOUS BLD VENIPUNCTURE: CPT

## 2021-01-09 PROCEDURE — 71045 X-RAY EXAM CHEST 1 VIEW: CPT

## 2021-01-09 PROCEDURE — 2060000000 HC ICU INTERMEDIATE R&B

## 2021-01-09 RX ORDER — ONDANSETRON 2 MG/ML
4 INJECTION INTRAMUSCULAR; INTRAVENOUS EVERY 6 HOURS PRN
Status: DISCONTINUED | OUTPATIENT
Start: 2021-01-09 | End: 2021-01-16 | Stop reason: HOSPADM

## 2021-01-09 RX ORDER — ATORVASTATIN CALCIUM 40 MG/1
40 TABLET, FILM COATED ORAL NIGHTLY
Status: DISCONTINUED | OUTPATIENT
Start: 2021-01-09 | End: 2021-01-16 | Stop reason: HOSPADM

## 2021-01-09 RX ORDER — FUROSEMIDE 10 MG/ML
40 INJECTION INTRAMUSCULAR; INTRAVENOUS ONCE
Status: COMPLETED | OUTPATIENT
Start: 2021-01-09 | End: 2021-01-09

## 2021-01-09 RX ORDER — SODIUM CHLORIDE 0.9 % (FLUSH) 0.9 %
10 SYRINGE (ML) INJECTION PRN
Status: DISCONTINUED | OUTPATIENT
Start: 2021-01-09 | End: 2021-01-16 | Stop reason: HOSPADM

## 2021-01-09 RX ORDER — ACETAMINOPHEN 325 MG/1
650 TABLET ORAL EVERY 6 HOURS PRN
Status: DISCONTINUED | OUTPATIENT
Start: 2021-01-09 | End: 2021-01-16 | Stop reason: HOSPADM

## 2021-01-09 RX ORDER — POLYETHYLENE GLYCOL 3350 17 G/17G
17 POWDER, FOR SOLUTION ORAL DAILY PRN
Status: DISCONTINUED | OUTPATIENT
Start: 2021-01-09 | End: 2021-01-16 | Stop reason: HOSPADM

## 2021-01-09 RX ORDER — FUROSEMIDE 10 MG/ML
40 INJECTION INTRAMUSCULAR; INTRAVENOUS 2 TIMES DAILY
Status: DISCONTINUED | OUTPATIENT
Start: 2021-01-09 | End: 2021-01-10

## 2021-01-09 RX ORDER — PROMETHAZINE HYDROCHLORIDE 12.5 MG/1
12.5 TABLET ORAL EVERY 6 HOURS PRN
Status: DISCONTINUED | OUTPATIENT
Start: 2021-01-09 | End: 2021-01-16 | Stop reason: HOSPADM

## 2021-01-09 RX ORDER — ACETAMINOPHEN 650 MG/1
650 SUPPOSITORY RECTAL EVERY 6 HOURS PRN
Status: DISCONTINUED | OUTPATIENT
Start: 2021-01-09 | End: 2021-01-16 | Stop reason: HOSPADM

## 2021-01-09 RX ORDER — SODIUM CHLORIDE 0.9 % (FLUSH) 0.9 %
10 SYRINGE (ML) INJECTION EVERY 12 HOURS SCHEDULED
Status: DISCONTINUED | OUTPATIENT
Start: 2021-01-09 | End: 2021-01-16 | Stop reason: HOSPADM

## 2021-01-09 RX ORDER — CARVEDILOL 12.5 MG/1
12.5 TABLET ORAL 2 TIMES DAILY WITH MEALS
Status: DISCONTINUED | OUTPATIENT
Start: 2021-01-09 | End: 2021-01-11

## 2021-01-09 RX ADMIN — SACUBITRIL AND VALSARTAN 1 TABLET: 24; 26 TABLET, FILM COATED ORAL at 20:33

## 2021-01-09 RX ADMIN — FUROSEMIDE 40 MG: 10 INJECTION, SOLUTION INTRAMUSCULAR; INTRAVENOUS at 15:41

## 2021-01-09 RX ADMIN — CARVEDILOL 12.5 MG: 12.5 TABLET, FILM COATED ORAL at 15:41

## 2021-01-09 RX ADMIN — SODIUM CHLORIDE, PRESERVATIVE FREE 10 ML: 5 INJECTION INTRAVENOUS at 20:33

## 2021-01-09 RX ADMIN — APIXABAN 2.5 MG: 5 TABLET, FILM COATED ORAL at 20:27

## 2021-01-09 RX ADMIN — ATORVASTATIN CALCIUM 40 MG: 40 TABLET, FILM COATED ORAL at 20:33

## 2021-01-09 RX ADMIN — FUROSEMIDE 40 MG: 10 INJECTION, SOLUTION INTRAMUSCULAR; INTRAVENOUS at 10:22

## 2021-01-09 ASSESSMENT — ENCOUNTER SYMPTOMS
APNEA: 0
TROUBLE SWALLOWING: 0
SHORTNESS OF BREATH: 1
RHINORRHEA: 0
WHEEZING: 0
EYES NEGATIVE: 1
CHEST TIGHTNESS: 0
ALLERGIC/IMMUNOLOGIC NEGATIVE: 1
VOMITING: 0
STRIDOR: 0
ABDOMINAL PAIN: 0
NAUSEA: 0

## 2021-01-09 ASSESSMENT — PAIN SCALES - GENERAL
PAINLEVEL_OUTOF10: 0

## 2021-01-09 NOTE — ED PROVIDER NOTES
[address] [facility] ED  eMERGENCY dEPARTMENT eNCOUnter      Pt Name: Hannah Ortiz. MRN: [id number]  Birthdate 11/19/1932  Date of evaluation: 1/9/2021  Provider: Lizbeth Berger MD    CHIEF COMPLAINT       Chief Complaint   Patient presents with    Shortness of Breath     pt c/o SOB that started this AM         HISTORY OF PRESENT ILLNESS   (Location/Symptom, Timing/Onset,Context/Setting, Quality, Duration, Modifying Factors, Severity)  Note limiting factors. Hannah Coronado is a 80 y.o. male who presents to the emergency department with increasing shortness of breath. Patient admits he has longstanding history of atrial fibrillation. Patient admits that over the last week or so he had increasing distal edema. Patient admits he has significant weight gain as well. Patient admits he did see his doctor who placed him on diuretics. Patient admits that seems to taken some pounds off however he is still having significant distal edema and has more shortness of breath. Patient is unable to lay flat on the bed. Patient requires several pillows. Patient admits however last night he is unable to sleep secondary to shortness of breath. Patient presents emergency department for evaluation of shortness of breath. Patient is he is currently on anticoagulation. Patient denies active chest pain. Patient denies nausea vomiting. Patient denies other acute symptoms at this time. HPI    NursingNotes were reviewed. REVIEW OF SYSTEMS    (2-9 systems for level 4, 10 or more for level 5)     Review of Systems   Constitutional: Positive for activity change and appetite change. Negative for chills and fever. HENT: Negative for congestion, ear pain, rhinorrhea and trouble swallowing. Eyes: Negative. Respiratory: Positive for shortness of breath. Negative for apnea, chest tightness, wheezing and stridor. Cardiovascular: Positive for leg swelling. Negative for chest pain. Gastrointestinal: Negative for abdominal pain, nausea and vomiting. Endocrine: Negative. Genitourinary: Negative for dysuria, frequency and hematuria. Musculoskeletal: Negative for gait problem and neck pain. Skin: Positive for rash. Admits significant bruising to right arm. It is not from a fall but rather from a blood pressure cuff reading. Again patient is on anticoagulation. Allergic/Immunologic: Negative. Neurological: Negative for seizures, syncope, speech difficulty and light-headedness. Hematological: Negative. Psychiatric/Behavioral: Negative for hallucinations, self-injury and suicidal ideas. Except as noted above the remainder of the review of systems was reviewed and negative. PAST MEDICAL HISTORY     Past Medical History:   Diagnosis Date    Atrial fibrillation ([facility] [address]     Colon cancer ([facility] [address]          SURGICALHISTORY       Past Surgical History:   Procedure Laterality Date    PACEMAKER PLACEMENT           CURRENT MEDICATIONS       Previous Medications    AMOXICILLIN (AMOXIL) 500 MG CAPSULE    TAKE 4 CAPSULES BY MOUTH ONE HOUR PRIOR TO DENTAL APPOINTMENT. APIXABAN (ELIQUIS) 5 MG TABS TABLET    Take 1 tablet by mouth 2 times daily    ASPIRIN 81 MG EC TABLET    Take 1 tablet by mouth daily    ATORVASTATIN (LIPITOR) 40 MG TABLET    Take 1 tablet by mouth nightly    CARVEDILOL (COREG) 12.5 MG TABLET    Take 12.5 mg by mouth 2 times daily (with meals)    FERROUS SULFATE (IRON) 28 MG TABS    TAKE 1 TAB BY MOUTH EVERY OTHER DAY    FUROSEMIDE (LASIX) 20 MG TABLET    Take 1 tablet by mouth once daily.     MELATONIN 3 MG TABS TABLET    Take 3 mg by mouth daily    NORTRIPTYLINE (PAMELOR) 10 MG CAPSULE    Take 1 capsule by mouth nightly    OCTREOTIDE ACETATE (SANDOSTATIN LAR) 20 MG INJECTION    INJECT 20MG INTRAMUSCULARLY EVERY 4 WEEKS    SACUBITRIL-VALSARTAN (ENTRESTO) 24-26 MG PER TABLET    Take 1 tablet by mouth 2 times daily       ALLERGIES     Morphine and Tramadol FAMILY HISTORY     History reviewed. No pertinent family history. SOCIAL HISTORY       Social History     Socioeconomic History    Marital status:      Spouse name: None    Number of children: None    Years of education: None    Highest education level: None   Occupational History    None   Social Needs    Financial resource strain: None    Food insecurity     Worry: None     Inability: None    Transportation needs     Medical: None     Non-medical: None   Tobacco Use    Smoking status: Never Smoker    Smokeless tobacco: Never Used   Substance and Sexual Activity    Alcohol use: Yes     Comment: glass of wine daily     Drug use: Never    Sexual activity: None   Lifestyle    Physical activity     Days per week: None     Minutes per session: None    Stress: None   Relationships    Social connections     Talks on phone: None     Gets together: None     Attends Temple service: None     Active member of club or organization: None     Attends meetings of clubs or organizations: None     Relationship status: None    Intimate partner violence     Fear of current or ex partner: None     Emotionally abused: None     Physically abused: None     Forced sexual activity: None   Other Topics Concern    None   Social History Narrative    None       SCREENINGS             PHYSICAL EXAM    (up to 7 for level 4, 8 or more for level 5)     ED Triage Vitals [01/09/21 0951]   BP Temp Temp Source Pulse Resp SpO2 Height Weight   (!) 129/97 97.3 °F (36.3 °C) Oral 88 16 97 % 5' 3\" (1.6 m) 161 lb (73 kg)       Physical Exam  Constitutional:       General: He is not in acute distress. Appearance: Normal appearance. He is well-developed and normal weight. He is not ill-appearing, toxic-appearing or diaphoretic. HENT:      Head: Normocephalic and atraumatic. Eyes:      Conjunctiva/sclera: Conjunctivae normal.      Pupils: Pupils are equal, round, and reactive to light.    Neck: Musculoskeletal: Full passive range of motion without pain, normal range of motion and neck supple. Trachea: Trachea normal.   Cardiovascular:      Rate and Rhythm: Normal rate. Rhythm irregular. Pulses: Normal pulses. Heart sounds: Murmur present. No friction rub. No gallop. Pulmonary:      Effort: Pulmonary effort is normal.      Breath sounds: Examination of the right-lower field reveals rales. Examination of the left-lower field reveals rales. Rales present. Abdominal:      General: Bowel sounds are normal.      Palpations: Abdomen is soft. Musculoskeletal: Normal range of motion. Right lower leg: Edema present. Left lower leg: Edema present. Skin:     General: Skin is warm and dry. Coloration: Skin is not cyanotic. Findings: Ecchymosis present. No erythema. Neurological:      Mental Status: He is alert and oriented to person, place, and time. Psychiatric:         Speech: Speech normal.         Behavior: Behavior normal.         Thought Content: Thought content normal.         Judgment: Judgment normal.         DIAGNOSTIC RESULTS     EKG: All EKG's are interpreted by the Emergency Department Physician who either signs or Co-signsthis chart in the absence of a cardiologist.    EKG demonstrates atrial fibrillation. Rate is 88. There are nonspecific ST segments. This is an abnormal overall EKG. RADIOLOGY:   Non-plain filmimages such as CT, Ultrasound and MRI are read by the radiologist. Plain radiographic images are visualized and preliminarily interpreted by the emergency physician with the below findings:    Single view chest x-ray indicates cardiomegaly. There is vascular congestion. Findings are consistent with congestive heart failure. There is an abnormal single view chest x-ray.     Interpretation per the Radiologist below, if available at the time ofthis note:    XR CHEST PORTABLE    (Results Pending)         ED BEDSIDE ULTRASOUND: Performed by ED Physician - none    LABS:  Labs Reviewed   COMPREHENSIVE METABOLIC PANEL - Abnormal; Notable for the following components:       Result Value    Sodium 132 (*)     CO2 19 (*)     Anion Gap 16 (*)     Glucose 118 (*)     BUN 51 (*)     CREATININE 1.97 (*)     GFR Non- 32.2 (*)     GFR  39.0 (*)     Total Bilirubin 1.3 (*)     All other components within normal limits    Narrative:     CALL  [place]  [facility] tel. [phone],  TROP results called to and read back by Janice Self, 01/09/2021 11:30, by  ALFREDO   CBC WITH AUTO DIFFERENTIAL - Abnormal; Notable for the following components:    RBC 4.16 (*)     Hemoglobin 9.6 (*)     Hematocrit 30.2 (*)     MCV 72.5 (*)     MCH 23.1 (*)     MCHC 31.8 (*)     RDW 16.9 (*)     Neutrophils Absolute 8.1 (*)     All other components within normal limits   TROPONIN - Abnormal; Notable for the following components:    Troponin 0.033 (*)     All other components within normal limits    Narrative:     CALL  [facility]  [facility] tel. [phone],  TROP results called to and read back by Janice Self, 01/09/2021 11:30, by  AltVocent Eleuterio   PROTIME-INR - Abnormal; Notable for the following components:    Protime 21.0 (*)     All other components within normal limits   BRAIN NATRIURETIC PEPTIDE   APTT   CK    Narrative:     Jessica Miller  [facility] tel. [phone],  TROP results called to and read back by Janice Self, 01/09/2021 11:30, by  AltMusicnotesase Asper   COVID-19   COVID-19       All other labs were within normal range or not returned as of this dictation.     EMERGENCY DEPARTMENT COURSE and DIFFERENTIAL DIAGNOSIS/MDM:   Vitals:    Vitals:    01/09/21 0951 01/09/21 1112 01/09/21 1159   BP: (!) 129/97 (!) 124/104 (!) 127/105   Pulse: 88 88 90   Resp: 16 16 16   Temp: 97.3 °F (36.3 °C)     TempSrc: Oral     SpO2: 97% 95% 95%   Weight: 161 lb (73 kg)     Height: 5' 3\" (1.6 m)

## 2021-01-09 NOTE — ACP (ADVANCE CARE PLANNING)
Advance Care Planning     Advance Care Planning Activator (Inpatient)  Conversation Note      Date of ACP Conversation: 1/9/2021    Conversation Conducted with: Patient with Decision Making Capacity and son at bedside in ER. ACP Activator: Geraldine Henriquez    *When Decision Maker makes decisions on behalf of the incapacitated patient: Decision Maker is asked to consider and make decisions based on patient values, known preferences, or best interests. Health Care Decision Maker:     Current Designated Health Care Decision Maker:    Primary Decision Maker: Junior Tam - [phone]    Secondary Decision Maker: Erin Schmitt Neetu  [phone]  (If there is a [address] named in the [address] Makers\" box in the ACP activity, but it is not visible above, be sure to open that field and then select the health care decision maker relationship (ie \"primary\") in the blank space to the right of the name.) Validate  this information as still accurate & up-to-date; edit GENWI 8 field as needed.)    Note: Assess and validate information in current ACP documents, as indicated. Note: If the relationship of these Decision-Makers to the patient does NOT follow your state's Next of Kin hierarchy, recommend that patient complete ACP document that meets state-specific requirements to allow them to act on the patient's behalf when appropriate. Care Preferences    Ventilation: \"If you were in your present state of health and suddenly became very ill and were unable to breathe on your own, what would your preference be about the use of a ventilator (breathing machine) if it were available to you? \"      Would the patient desire the use of ventilator (breathing machine)?: yes \"If your health worsens and it becomes clear that your chance of recovery is unlikely, what would your preference be about the use of a ventilator (breathing machine) if it were available to you? \"     Would the patient desire the use of ventilator (breathing machine)?: No    Resuscitation  \"CPR works best to restart the heart when there is a sudden event, like a heart attack, in someone who is otherwise healthy. Unfortunately, CPR does not typically restart the heart for people who have serious health conditions or who are very sick. \"    \"In the event your heart stopped as a result of an underlying serious health condition, would you want attempts to be made to restart your heart (answer \"yes\" for attempt to resuscitate) or would you prefer a natural death (answer \"no\" for do not attempt to resuscitate)? \" yes    NOTE: If the patient has a valid advance directive AND now provides care preference(s) that are inconsistent with that prior directive, advise the patient to consider either: creating a new advance directive that complies with state-specific requirements; or, if that is not possible, orally revoking that prior directive in accordance with state-specific requirements, which must be documented in the EHR. [] Yes   [] No   Educated Patient / Zofia Gonzalez regarding differences between Advance Directives and portable DNR orders.     Length of ACP Conversation in minutes:      Conversation Outcomes:  [x] ACP discussion completed  [] Existing advance directive reviewed with patient; no changes to patient's previously recorded wishes  [] New Advance Directive completed  [] Portable Do Not Rescitate prepared for Provider review and signature  [] POLST/POST/MOLST/MOST prepared for Provider review and signature      Follow-up plan:    [] Schedule follow-up conversation to continue planning  [] Referred individual to Provider for additional questions/concerns [] Advised patient/agent/surrogate to review completed ACP document and update if needed with changes in condition, patient preferences or care setting    [x] This note routed to one or more involved healthcare providers     NOTE: ER CM met with patient and his son who patient has named as primary decision maker. Both patient and son state that patient has Advance Directives. ER CM advised patient/son to bring in copies to be scanned into Epic as no documents are currently in the system. ACP note routed to PCP Dr. Helga Guallpa from the [place].

## 2021-01-09 NOTE — ED NOTES
Pt has a large hematoma to RUE, sensation and movement intact, pulses palpable, ROM limited, denies trauma     Dorian Gonzalez, LAURIE  01/09/21 2792

## 2021-01-09 NOTE — H&P
Hospital Medicine  History and Physical    Patient:  Regina Oh MRN: [id number]    CHIEF COMPLAINT:    Chief Complaint   Patient presents with    Shortness of Breath     pt c/o SOB that started this AM       History Obtained From:  Patient, EMR  Primary Care Physician: Yareli Padilla MD    HISTORY OF PRESENT ILLNESS:   The patient is a 80 y.o. male who presents with shortness of breath. Duration of symptoms: Weeks. Timing: Gradually worsening. Aggravated with exertion, decreased with rest.  Associated with orthopnea using significant amount of pillows. Difficulty sleeping for last many days. Son saw patient today saw the amount of edema he had as well as significant dyspnea on mild exertion. Therefore brought to the hospital for further evaluation. Not associated with fever. Past Medical History:      Diagnosis Date    Atrial fibrillation ([facility] [address]     Colon cancer [facility])        Past Surgical History:      Procedure Laterality Date    PACEMAKER PLACEMENT         Medications Prior to Admission:    Prior to Admission medications    Medication Sig Start Date End Date Taking? Authorizing Provider   Ferrous Sulfate (IRON) 28 MG TABS TAKE 1 TAB BY MOUTH EVERY OTHER DAY 9/1/20   Historical Provider, MD   amoxicillin (AMOXIL) 500 MG capsule TAKE 4 CAPSULES BY MOUTH ONE HOUR PRIOR TO DENTAL APPOINTMENT. 10/8/20   Historical Provider, MD   furosemide (LASIX) 20 MG tablet Take 1 tablet by mouth once daily.  12/16/20   Historical Provider, MD   octreotide ACETATE (SANDOSTATIN LAR) 20 MG injection INJECT 20MG INTRAMUSCULARLY EVERY 4 WEEKS    Historical Provider, MD   melatonin 3 MG TABS tablet Take 3 mg by mouth daily    Historical Provider, MD   nortriptyline (PAMELOR) 10 MG capsule Take 1 capsule by mouth nightly 1/4/21   Patsy Pool MD   aspirin 81 MG EC tablet Take 1 tablet by mouth daily  Patient not taking: Reported on 1/4/2021 4/16/19   Horacio Acosta PA-C apixaban (ELIQUIS) 5 MG TABS tablet Take 1 tablet by mouth 2 times daily 4/15/19   Toni Foster PA-C   atorvastatin (LIPITOR) 40 MG tablet Take 1 tablet by mouth nightly 4/15/19   Toni Foster PA-C   carvedilol (COREG) 12.5 MG tablet Take 12.5 mg by mouth 2 times daily (with meals)    Historical Provider, MD   sacubitril-valsartan (ENTRESTO) 24-26 MG per tablet Take 1 tablet by mouth 2 times daily    Historical Provider, MD       Allergies:  Morphine and Tramadol    Social History:   TOBACCO:   reports that he has never smoked. He has never used smokeless tobacco.  ETOH:   reports current alcohol use. Family History:   History reviewed. No pertinent family history. REVIEW OF SYSTEMS:  Ten systems reviewed and negative except as stated in the HPI    Physical Exam:    Vitals: BP (!) 127/105   Pulse 90   Temp 97.3 °F (36.3 °C) (Oral)   Resp 16   Ht 5' 3\" (1.6 m)   Wt 161 lb (73 kg)   SpO2 95%   BMI 28.52 kg/m²   General appearance: alert, appears stated age and cooperative  Skin: Skin color, texture, turgor normal. No rashes or lesions  HEENT: Head: Normocephalic, no lesions, without obvious abnormality. . No dental issues   Neck: Enlarged jugular venous distention  Lungs: Normal effort of breathing  Heart: regular rate and rhythm, S1, S2 normal, no murmur, click, rub or gallop  Abdomen: soft, non-tender; bowel sounds normal; no masses,  no organomegaly  Extremities: Leg edema, thigh edema, abdominal edema  Neurologic: Mental status: Alert, oriented, thought content appropriate.        Recent Labs     01/09/21  1030   WBC 10.3   HGB 9.6*        Recent Labs     01/09/21  1030   *   K 4.8   CL 97   CO2 19*   BUN 51*   CREATININE 1.97*   GLUCOSE 118*   AST 34   ALT 18   BILITOT 1.3*   ALKPHOS 59     Troponin T:   Recent Labs     01/09/21  1030   TROPONINI 0.033*       ABGs: No results found for: PHART, PO2ART, [id number]  INR:   Recent Labs     01/09/21  1030   INR 1.8 URINALYSIS:No results for input(s): NITRITE, COLORU, PHUR, LABCAST, WBCUA, RBCUA, MUCUS, TRICHOMONAS, YEAST, BACTERIA, CLARITYU, SPECGRAV, LEUKOCYTESUR, UROBILINOGEN, BILIRUBINUR, BLOODU, GLUCOSEU, AMORPHOUS in the last 72 hours. Invalid input(s): Mili Treviño  -----------------------------------------------------------------   No results found. Assessment and Plan   1. Acute diastolic HF: orthopnea, elev bnp. Diuresis, cxr, echo (echo a few months ago in [facility] clinic showed ejection fraction 30, elevated valve gradient, likely with underlying diastolic dysfunction as well). Monitor renal function. 2. Hypertension: Monitor blood pressure. Restart home medications. 3. PAYTON: Anticipate improvement with diuresis. If creatinine worsens or the hospital course, may have to consider temporarily discontinuing Entresto; this decision would be made in coordination with cardiology. 4. History of valve disease with replacement  5. Atrial fibrillation: Rate controlled. History of pacemaker. On anticoagulation, reduce apixaban dose to renal dosing given creatinine greater than 1.5 in a gentleman who is greater than 80 in age (as per  recommendation guidelines of renal dosing)  6. .  When creatinine becomes less than 1.5, restart full dose anticoagulationDVT ppx  7. Disposition: Dependent on hospital course. Will discharge once medically stable. SW on board for discharge planning.      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

## 2021-01-09 NOTE — CARE COORDINATION
[facility] EMERGENCY [facility] AT [place] Case Management Initial Discharge Assessment    Met with patient and his son at bedside in ER to discuss discharge plan. PCP: Merry Patel MD - at the [facility]                                Date of Last Visit:     If no PCP, list provided? N/A    Discharge Planning    Living Arrangements: Patient lives independently at home in a one-story house. Who do you live with? [address] [place] [address] ([phone])    Who helps you with your care:  Patient is independent with ADLs and IADLs. If lives at home: Do you have any barriers navigating in your home? No    Patient can perform ADL? Yes    Current Services (outpatient and in home) :  None    Dialysis: No    Is transportation available to get to your appointments? Yes    DME Equipment:  None    Respiratory equipment: None    Respiratory provider:  YA      Pharmacy:  Patient receives all long-term meds through the [facility]. Short-term meds are filled at [facility] [address] with Medication Assistance Program?  No      Patient agreeable to [place] [address]? Patient will accept [place] [address] and prefers [facility]. Patient agreeable to SNF/Rehab? Yes, Company - Patient is agreeable to rehab at [facility]). He has participated in rehab here before. Other discharge needs identified? Other - To be determined following work-up and treatment. Freedom of choice list provided with basic dialogue that supports the patient's individualized plan of care/goals and shares the quality data associated with the providers. N/A - List was offered but patient/son made choices regarding HHC/rehab and do not require a list.    Does Patient Have a High-Risk for Readmission Diagnosis (CHF, PN, MI, COPD)?  Yes - CHF    History: CHF, afib, pacemaker, colon cancer, left cerebral stroke, aortic stenosis with aortic valve replacement 2017, hypertension, LTHR The plan for Transition of Care is related to the following treatment goals: Eval/mgmt heart failure with elevated pro-BNP and troponin. Initial Discharge Plan? (Note: please see concurrent daily documentation for any updates after initial note). Home with spouse (and [facility] if ordered). The Patient and/or patient representative: Patient/son were provided with notice of choice of any post-acute providers for care and equipment and agrees with discharge plan.  - Yes    Electronically signed by Sanjuanita He RN on 1/9/2021 at 12:55 PM

## 2021-01-09 NOTE — ED TRIAGE NOTES
Pt c/o SOB that started this AM, recently started on medication for BLE edema, Pt is A&OX3, calm, ambulatory, afebrile, breathes are equal and unlabored, denies pain

## 2021-01-09 NOTE — ED NOTES
Bed: 11  Expected date: 1/9/21  Expected time:   Means of arrival:   Comments:  Sob male  20 g in  left ac     April L Richelle Hancock RN  01/09/21 1167

## 2021-01-10 LAB
ANION GAP SERPL CALCULATED.3IONS-SCNC: 19 MEQ/L (ref 9–15)
BUN BLDV-MCNC: 63 MG/DL (ref 8–23)
CALCIUM SERPL-MCNC: 9.6 MG/DL (ref 8.5–9.9)
CHLORIDE BLD-SCNC: 95 MEQ/L (ref 95–107)
CO2: 18 MEQ/L (ref 20–31)
CREAT SERPL-MCNC: 2.14 MG/DL (ref 0.7–1.2)
GFR AFRICAN AMERICAN: 35.5
GFR NON-AFRICAN AMERICAN: 29.3
GLUCOSE BLD-MCNC: 124 MG/DL (ref 70–99)
POTASSIUM SERPL-SCNC: 4.7 MEQ/L (ref 3.4–4.9)
SODIUM BLD-SCNC: 132 MEQ/L (ref 135–144)

## 2021-01-10 PROCEDURE — 99223 1ST HOSP IP/OBS HIGH 75: CPT | Performed by: INTERNAL MEDICINE

## 2021-01-10 PROCEDURE — 80048 BASIC METABOLIC PNL TOTAL CA: CPT

## 2021-01-10 PROCEDURE — 6360000002 HC RX W HCPCS: Performed by: INTERNAL MEDICINE

## 2021-01-10 PROCEDURE — 2580000003 HC RX 258: Performed by: INTERNAL MEDICINE

## 2021-01-10 PROCEDURE — 6370000000 HC RX 637 (ALT 250 FOR IP): Performed by: INTERNAL MEDICINE

## 2021-01-10 PROCEDURE — 36415 COLL VENOUS BLD VENIPUNCTURE: CPT

## 2021-01-10 PROCEDURE — 2060000000 HC ICU INTERMEDIATE R&B

## 2021-01-10 RX ORDER — FUROSEMIDE 10 MG/ML
40 INJECTION INTRAMUSCULAR; INTRAVENOUS DAILY
Status: DISCONTINUED | OUTPATIENT
Start: 2021-01-11 | End: 2021-01-10

## 2021-01-10 RX ORDER — FUROSEMIDE 10 MG/ML
20 INJECTION INTRAMUSCULAR; INTRAVENOUS
Status: DISCONTINUED | OUTPATIENT
Start: 2021-01-10 | End: 2021-01-10

## 2021-01-10 RX ORDER — ZOLPIDEM TARTRATE 5 MG/1
5 TABLET ORAL NIGHTLY PRN
Status: DISCONTINUED | OUTPATIENT
Start: 2021-01-10 | End: 2021-01-12

## 2021-01-10 RX ORDER — FUROSEMIDE 10 MG/ML
20 INJECTION INTRAMUSCULAR; INTRAVENOUS 2 TIMES DAILY
Status: DISCONTINUED | OUTPATIENT
Start: 2021-01-10 | End: 2021-01-12

## 2021-01-10 RX ADMIN — CARVEDILOL 12.5 MG: 12.5 TABLET, FILM COATED ORAL at 18:25

## 2021-01-10 RX ADMIN — APIXABAN 2.5 MG: 5 TABLET, FILM COATED ORAL at 20:15

## 2021-01-10 RX ADMIN — CARVEDILOL 12.5 MG: 12.5 TABLET, FILM COATED ORAL at 09:26

## 2021-01-10 RX ADMIN — SACUBITRIL AND VALSARTAN 1 TABLET: 24; 26 TABLET, FILM COATED ORAL at 09:26

## 2021-01-10 RX ADMIN — APIXABAN 2.5 MG: 5 TABLET, FILM COATED ORAL at 09:26

## 2021-01-10 RX ADMIN — ZOLPIDEM TARTRATE 5 MG: 5 TABLET ORAL at 22:56

## 2021-01-10 RX ADMIN — SODIUM CHLORIDE, PRESERVATIVE FREE 10 ML: 5 INJECTION INTRAVENOUS at 20:16

## 2021-01-10 RX ADMIN — ATORVASTATIN CALCIUM 40 MG: 40 TABLET, FILM COATED ORAL at 20:16

## 2021-01-10 RX ADMIN — FUROSEMIDE 40 MG: 10 INJECTION, SOLUTION INTRAMUSCULAR; INTRAVENOUS at 09:26

## 2021-01-10 RX ADMIN — SACUBITRIL AND VALSARTAN 1 TABLET: 24; 26 TABLET, FILM COATED ORAL at 20:15

## 2021-01-10 RX ADMIN — FUROSEMIDE 20 MG: 10 INJECTION, SOLUTION INTRAVENOUS at 18:25

## 2021-01-10 RX ADMIN — SODIUM CHLORIDE, PRESERVATIVE FREE 10 ML: 5 INJECTION INTRAVENOUS at 09:26

## 2021-01-10 ASSESSMENT — PAIN SCALES - GENERAL
PAINLEVEL_OUTOF10: 0

## 2021-01-10 NOTE — CONSULTS
Consult Note  Patient: Kiley Arellano. Unit/Bed: M456/I048-11  YOB: 1932  MRN: [id number]  Acct: [de-identified]   Admitting Diagnosis: Acute diastolic heart failure ([address] [I50.31]  Date:  1/9/2021  Hospital Day: 1      Chief Complaint:  Shortness of breath with minimal exertion    History of Present Illness:  Cardiac history of a TAVR in the past.  Ventricular tachycardia status post AICD all the cardiac work-up was done at [facility]. Still drives. Just minimal exertion he gets short of breath no ICD shocks.     Allergies   Allergen Reactions    Morphine      hallucinations      Tramadol Nausea And Vomiting     Constipation       Current Facility-Administered Medications   Medication Dose Route Frequency Provider Last Rate Last Admin    [START ON 1/11/2021] furosemide (LASIX) injection 40 mg  40 mg Intravenous Daily Raine Albrecht MD        furosemide (LASIX) injection 20 mg  20 mg Intravenous Lunch Raine Albrecht MD        [facility]) tablet 2.5 mg  2.5 mg Oral BID Raine Albrecht MD   2.5 mg at 01/10/21 0926    atorvastatin (LIPITOR) tablet 40 mg  40 mg Oral Nightly Raine Albrecht MD   40 mg at 01/09/21 2033    carvedilol (COREG) tablet 12.5 mg  12.5 mg Oral BID WC Raine Albrecht MD   12.5 mg at 01/10/21 0926    sacubitril-valsartan (ENTRESTO) 24-26 MG per tablet 1 tablet  1 tablet Oral BID Raine Albrecht MD   1 tablet at 01/10/21 0926    sodium chloride flush 0.9 % injection 10 mL  10 mL Intravenous 2 times per day Raine Albrecht MD   10 mL at 01/10/21 0926    sodium chloride flush 0.9 % injection 10 mL  10 mL Intravenous PRN Raine Albrecht MD        promethazine (PHENERGAN) tablet 12.5 mg  12.5 mg Oral Q6H PRN Raine Albrecht MD        Or    ondansetron (ZOFRAN) injection 4 mg  4 mg Intravenous Q6H PRN Raine Albrecht MD        polyethylene glycol (GLYCOLAX) packet 17 g  17 g Oral Daily PRN Raine Albrecht MD Transthoracic Echocardiography Report (TTE)  Demographics  Patient Name   Shine Frank   Gender              Male                 Sherrill Chisholm.  Patient Number [id number]        Race                                                 Ethnicity  Visit Number   [id number]       Room Number         L315  Corporate ID                   Date of Study       01/09/2021  Accession      [id number]      Referring Physician  Number  Date of Birth  11/19/1932      Sonographer         Angela Johnson LPN  Age            80 year(s)      Interpreting        [facility]) Cardiology                                 Physician           Valerie Izquierdo Procedure Type of Study  TTE procedure:ECHO COMPLETE 2D W/DOP W/COLOR. Procedure Date Date: 01/09/2021 Start: 12:47 PM Study Location: Portable Technical Quality: Adequate visualization Indications:Shortness of breath and Congestive heart failure. Patient Status: Routine Height: 63 inches Weight: 161 pounds BSA: 1.76 m^2 BMI: 28.52 kg/m^2 BP: 133/99 mmHg  Conclusions  Summary  Moderately severe (3+) mitral regurgitation is present. S/P TAVR  Peak/mean gradient 17/7 mm Hg YASMINE 0.9 cm2  Normal tricuspid valve structure and function. There is mild to moderate ( 2 +) tricuspid regurgitation with estimated  RVSP of 58 mm Hg. Moderately dilated left atrium. Left ventricular ejection fraction is visually estimated at 15%. Abnormal (paradoxical) motion consistent with RV pacemaker. Restrictive filling pattern. Normal right ventricle structure and function. Pacer wire visualized in right ventricle. Signature  ----------------------------------------------------------------  Electronically signed by Kenneth Khan(Interpreting physician)  on 01/09/2021 05:51 PM  ----------------------------------------------------------------  Findings Left Ventricle Left ventricular ejection fraction is visually estimated at 15%.  Abnormal (paradoxical) TR Gradient: 48.89 mmHg  Pulmonic Valve  Peak Velocity: 0.69 m/s             Peak Gradient: 1.91 mmHg  LVOT  Peak Velocity: 0.63 m/s              Mean Velocity: 0.37 m/s  Peak Gradient: 1.49 mmHg             Mean Gradient: 0.66 mmHg  LVOT Diameter: 1.98 cm               LVOT VTI: 9.6 cm Structures  Left Atrium  LA Dimension: 4.5 cm                          LA Area: 19.9 cm^2  LA/Aorta: 1.48  LA Volume/Index: 43.34 ml /25 m^2  Left Ventricle  Diastolic Dimension: 4.62 cm         Systolic Dimension: 5.23 cm  Septum Diastolic: 0.56 cm            Septum Systolic: 8.65 cm  PW Diastolic: 7.93 cm                PW Systolic: 3.14 cm                                       FS: 5.1 %  LV EDV/LV EDV Index: 218.7 ml/124    LV ESV/LV ESV Index: 194.95 ml/111  m^2                                  m^2  EF Calculated: 10.9 %  LVOT Diameter: 1.98 cm  Right Ventricle  Diastolic Dimension: 7.85 cm Aorta/ Miscellaneous Aorta  Aortic Root: 3.04 cm  LVOT Diameter: 1.98 cm    Xr Chest Portable    Result Date: 1/9/2021  Exam: XR CHEST PORTABLE History: CHF Technique: AP portable view of the chest obtained. Comparison: Portable chest radiograph from April 11, 2019 Findings: Suboptimal inspiration. Left-sided cardiac defibrillator is present. Atherosclerotic calcification of the thoracic aorta. Postsurgical changes of aortic valve replacement. Moderate cardiomegaly. Pulmonary vascular congestion. Possible small left pleural effusion. No pneumothorax or consolidation. No acute osseous abnormality. Degenerative changes of the spine. Cardiomegaly and pulmonary vascular congestion. Possible small left pleural effusion.        EKG:   Assessment:    Active Hospital Problems    Diagnosis Date Noted    Acute diastolic heart failure (HCC) [I50.31] 01/09/2021         Acute systolic and diastolic heart failure with                restrictive filling pattern        Status post TAVR        Acute on chronic renal insufficiency         AICD

## 2021-01-10 NOTE — PROGRESS NOTES
Hospitalist Progress Note      Date of Admission: 1/9/2021  Chief Complaint:    Chief Complaint   Patient presents with    Shortness of Breath     pt c/o SOB that started this AM     Subjective:  No new complaints. No nausea, vomiting, chest pain, or headache      Medications:    Infusion Medications   Scheduled Medications    furosemide  20 mg Intravenous BID    apixaban  2.5 mg Oral BID    atorvastatin  40 mg Oral Nightly    carvedilol  12.5 mg Oral BID     sacubitril-valsartan  1 tablet Oral BID    sodium chloride flush  10 mL Intravenous 2 times per day     PRN Meds: zolpidem, sodium chloride flush, promethazine **OR** ondansetron, polyethylene glycol, acetaminophen **OR** acetaminophen    Intake/Output Summary (Last 24 hours) at 1/10/2021 1348  Last data filed at 1/10/2021 0650  Gross per 24 hour   Intake 120 ml   Output 680 ml   Net -560 ml     Exam:  BP (!) 123/102   Pulse 93   Temp 97 °F (36.1 °C) (Oral)   Resp 18   Ht 5' 3\" (1.6 m)   Wt 162 lb 8 oz (73.7 kg)   SpO2 95%   BMI 28.79 kg/m²   Head: Normocephalic, atraumatic  Sclera clear  Neck JVD enlarged  Lungs: normal effort of breathing  Leg edema ++    Labs:   Recent Labs     01/09/21  1030   WBC 10.3   HGB 9.6*   HCT 30.2*        Recent Labs     01/09/21  1030 01/10/21  0557   * 132*   K 4.8 4.7   CL 97 95   CO2 19* 18*   BUN 51* 63*   CREATININE 1.97* 2.14*   CALCIUM 9.1 9.6   AST 34  --    ALT 18  --    BILITOT 1.3*  --    ALKPHOS 59  --      Recent Labs     01/09/21  1030   INR 1.8     Recent Labs     01/09/21  1030   CKTOTAL 183   TROPONINI 0.033*     Radiology:  XR CHEST PORTABLE   Final Result      Cardiomegaly and pulmonary vascular congestion. Possible small left pleural effusion. Assessment/Plan:    Acute combined diastolic and systolic heart failure: Ejection fraction 30, elevated valve gradients. History of Aortic valve surgery. Barron palm ordered. PAYTON on CKD: Gentle IV diuresis, will likely require days to slowly get off the fluid. Monitoring renal function closely. May have some underlying loop resistance, depending on clinical course may require a small dose of metolazone. Monitor intake/output    Mild troponin elevation: Following with cardiology. Atrial fibrillation: Rate controlled, history of pacemaker/AICD. On anticoagulation: Renal dosing.     35 minutes total care time, >1/2 in unit/floor time and care coordination       Diogo Benavides MD

## 2021-01-11 LAB
ANION GAP SERPL CALCULATED.3IONS-SCNC: 17 MEQ/L (ref 9–15)
BASE EXCESS ARTERIAL: -3 (ref -3–3)
BUN BLDV-MCNC: 67 MG/DL (ref 8–23)
CALCIUM IONIZED: 1.02 MMOL/L (ref 1.12–1.32)
CALCIUM SERPL-MCNC: 9.1 MG/DL (ref 8.5–9.9)
CHLORIDE BLD-SCNC: 95 MEQ/L (ref 95–107)
CO2: 18 MEQ/L (ref 20–31)
CREAT SERPL-MCNC: 2.17 MG/DL (ref 0.7–1.2)
GFR AFRICAN AMERICAN: 34
GFR AFRICAN AMERICAN: 34.9
GFR NON-AFRICAN AMERICAN: 28
GFR NON-AFRICAN AMERICAN: 28.8
GLUCOSE BLD-MCNC: 108 MG/DL (ref 70–99)
GLUCOSE BLD-MCNC: 144 MG/DL (ref 60–115)
HCO3 ARTERIAL: 20.9 MMOL/L (ref 21–29)
HEMOGLOBIN: 9.5 GM/DL (ref 13.5–17.5)
LACTATE: 1.93 MMOL/L (ref 0.4–2)
O2 SAT, ARTERIAL: 97 % (ref 93–100)
PCO2 ARTERIAL: 30 MM HG (ref 35–45)
PERFORMED ON: ABNORMAL
PH ARTERIAL: 7.45 (ref 7.35–7.45)
PO2 ARTERIAL: 80 MM HG (ref 75–108)
POC CHLORIDE: 97 MEQ/L (ref 99–110)
POC CREATININE: 2.2 MG/DL (ref 0.8–1.3)
POC HEMATOCRIT: 28 % (ref 41–53)
POC POTASSIUM: 4.3 MEQ/L (ref 3.5–5.1)
POC SAMPLE TYPE: ABNORMAL
POC SODIUM: 128 MEQ/L (ref 136–145)
POTASSIUM SERPL-SCNC: 4.5 MEQ/L (ref 3.4–4.9)
SODIUM BLD-SCNC: 130 MEQ/L (ref 135–144)
TCO2 ARTERIAL: 22 (ref 22–29)

## 2021-01-11 PROCEDURE — 82803 BLOOD GASES ANY COMBINATION: CPT

## 2021-01-11 PROCEDURE — 2060000000 HC ICU INTERMEDIATE R&B

## 2021-01-11 PROCEDURE — 99232 SBSQ HOSP IP/OBS MODERATE 35: CPT | Performed by: INTERNAL MEDICINE

## 2021-01-11 PROCEDURE — 82435 ASSAY OF BLOOD CHLORIDE: CPT

## 2021-01-11 PROCEDURE — 6370000000 HC RX 637 (ALT 250 FOR IP): Performed by: INTERNAL MEDICINE

## 2021-01-11 PROCEDURE — 36600 WITHDRAWAL OF ARTERIAL BLOOD: CPT

## 2021-01-11 PROCEDURE — 85014 HEMATOCRIT: CPT

## 2021-01-11 PROCEDURE — 83605 ASSAY OF LACTIC ACID: CPT

## 2021-01-11 PROCEDURE — 6360000002 HC RX W HCPCS: Performed by: INTERNAL MEDICINE

## 2021-01-11 PROCEDURE — 2580000003 HC RX 258: Performed by: INTERNAL MEDICINE

## 2021-01-11 PROCEDURE — 82565 ASSAY OF CREATININE: CPT

## 2021-01-11 PROCEDURE — 84295 ASSAY OF SERUM SODIUM: CPT

## 2021-01-11 PROCEDURE — 80048 BASIC METABOLIC PNL TOTAL CA: CPT

## 2021-01-11 PROCEDURE — 84132 ASSAY OF SERUM POTASSIUM: CPT

## 2021-01-11 PROCEDURE — 82330 ASSAY OF CALCIUM: CPT

## 2021-01-11 PROCEDURE — 36415 COLL VENOUS BLD VENIPUNCTURE: CPT

## 2021-01-11 RX ORDER — CARVEDILOL 25 MG/1
25 TABLET ORAL 2 TIMES DAILY WITH MEALS
Status: DISCONTINUED | OUTPATIENT
Start: 2021-01-11 | End: 2021-01-16 | Stop reason: HOSPADM

## 2021-01-11 RX ADMIN — ATORVASTATIN CALCIUM 40 MG: 40 TABLET, FILM COATED ORAL at 20:45

## 2021-01-11 RX ADMIN — APIXABAN 2.5 MG: 5 TABLET, FILM COATED ORAL at 20:45

## 2021-01-11 RX ADMIN — SODIUM CHLORIDE, PRESERVATIVE FREE 10 ML: 5 INJECTION INTRAVENOUS at 20:46

## 2021-01-11 RX ADMIN — CARVEDILOL 12.5 MG: 12.5 TABLET, FILM COATED ORAL at 07:00

## 2021-01-11 RX ADMIN — SACUBITRIL AND VALSARTAN 1 TABLET: 24; 26 TABLET, FILM COATED ORAL at 07:00

## 2021-01-11 RX ADMIN — CARVEDILOL 25 MG: 25 TABLET, FILM COATED ORAL at 16:41

## 2021-01-11 RX ADMIN — SACUBITRIL AND VALSARTAN 1 TABLET: 24; 26 TABLET, FILM COATED ORAL at 20:45

## 2021-01-11 RX ADMIN — ACETAMINOPHEN 650 MG: 325 TABLET, FILM COATED ORAL at 06:01

## 2021-01-11 RX ADMIN — APIXABAN 2.5 MG: 5 TABLET, FILM COATED ORAL at 06:02

## 2021-01-11 RX ADMIN — SODIUM CHLORIDE, PRESERVATIVE FREE 10 ML: 5 INJECTION INTRAVENOUS at 07:00

## 2021-01-11 RX ADMIN — FUROSEMIDE 20 MG: 10 INJECTION, SOLUTION INTRAVENOUS at 07:00

## 2021-01-11 RX ADMIN — FUROSEMIDE 20 MG: 10 INJECTION, SOLUTION INTRAVENOUS at 16:41

## 2021-01-11 ASSESSMENT — ENCOUNTER SYMPTOMS
ABDOMINAL DISTENTION: 0
TROUBLE SWALLOWING: 0
CHEST TIGHTNESS: 0
SHORTNESS OF BREATH: 0
VOMITING: 0
NAUSEA: 0
COLOR CHANGE: 0
ANAL BLEEDING: 0
VOICE CHANGE: 0
FACIAL SWELLING: 0
APNEA: 0
BLOOD IN STOOL: 0
WHEEZING: 0

## 2021-01-11 ASSESSMENT — PAIN SCALES - WONG BAKER
WONGBAKER_NUMERICALRESPONSE: 0

## 2021-01-11 ASSESSMENT — PAIN SCALES - GENERAL
PAINLEVEL_OUTOF10: 0
PAINLEVEL_OUTOF10: 0

## 2021-01-11 NOTE — CARE COORDINATION
Patient asleep in chair. Care Transition Nurse will attempt to see patient at another time to provide CHF education.

## 2021-01-11 NOTE — CONSULTS
Cardiac Rehab Consult Note  Name: Compa Hayward. Age: 80 y.o. Gender: male    Chief Complaint:Shortness of Breath (pt c/o SOB that started this AM)    Primary Care Provider: Nile Dejesus MD  InpatientTreatment Team: Treatment Team: Attending Provider: Ladonna Sommer DO; Consulting Physician: Sherren Laine, MD; Utilization Reviewer: Sivan Reeves RN; Registered Nurse: García Nguyen RN; Patient Care Tech: Poly Mccurdy; : Hilda Tyson RN; : [facility]  Admission Date: 1/9/2021    Consult Received from Dr. Ronak Coello. Reason for consult CHF. Patient visited at bedside. Program and benefits introduced. Brochures given. Patient stated he is very interested and would like a follow up call when the department re-opens. Active Problems:    Acute diastolic heart failure ([address]  Resolved Problems:    * No resolved hospital problems. *       Plan: Follow up with patient in 5-6 weeks. All of pt questions were answered. Case will be discussed with physician when appropriate.      Electronically signed by Abdoul Cross PTA on 1/11/2021 at 9:03 AM

## 2021-01-11 NOTE — PROGRESS NOTES
Progress Note  Patient: Evelin Cheung. Unit/Bed: F575/Y790-33  YOB: 1932  MRN: [id number]  Acct: [de-identified]   Admitting Diagnosis: Acute diastolic heart failure ([address] [I50.31]  Date:  1/9/2021  Hospital Day: 2    Chief Complaint:  Congestive heart failure    Subjective  Known to have ischemic cardiomyopathy status post AICD status post TAVR moderate renal insufficiency. Minimal exercise tolerance some ankle edema. No ICD shocks    Review of Systems:   Review of Systems   Constitutional: Negative for activity change, appetite change, diaphoresis, fatigue and unexpected weight change. HENT: Negative for facial swelling, nosebleeds, trouble swallowing and voice change. Respiratory: Negative for apnea, chest tightness, shortness of breath and wheezing. Cardiovascular: Negative for chest pain, palpitations and leg swelling. Gastrointestinal: Negative for abdominal distention, anal bleeding, blood in stool, nausea and vomiting. Genitourinary: Negative for decreased urine volume and dysuria. Musculoskeletal: Negative for gait problem and myalgias. Skin: Negative for color change, pallor, rash and wound. Neurological: Negative for dizziness, syncope, facial asymmetry, weakness, light-headedness, numbness and headaches. Hematological: Does not bruise/bleed easily. Psychiatric/Behavioral: Negative for agitation, behavioral problems, confusion, hallucinations and suicidal ideas. The patient is not nervous/anxious. All other systems reviewed and are negative. Physical Examination:    /81   Pulse 92   Temp 97.9 °F (36.6 °C) (Oral)   Resp 18   Ht 5' 3\" (1.6 m)   Wt 162 lb 8 oz (73.7 kg)   SpO2 96%   BMI 28.79 kg/m²    Physical Exam  Cardiovascular:      Heart sounds: Gallop present.           LABS:  CBC:   Lab Results   Component Value Date    WBC 10.3 01/09/2021    RBC 4.16 01/09/2021    HGB 9.6 01/09/2021    HCT 30.2 01/09/2021    MCV 72.5 01/09/2021 MCH 23.1 01/09/2021    MCHC 31.8 01/09/2021    RDW 16.9 01/09/2021     01/09/2021     CBC with Differential:   Lab Results   Component Value Date    WBC 10.3 01/09/2021    RBC 4.16 01/09/2021    HGB 9.6 01/09/2021    HCT 30.2 01/09/2021     01/09/2021    MCV 72.5 01/09/2021    MCH 23.1 01/09/2021    MCHC 31.8 01/09/2021    RDW 16.9 01/09/2021    LYMPHOPCT 16.9 01/09/2021    MONOPCT 4.6 01/09/2021    BASOPCT 0.3 01/09/2021    MONOSABS 0.5 01/09/2021    LYMPHSABS 1.8 01/09/2021    EOSABS 0.0 01/09/2021    BASOSABS 0.0 01/09/2021     CMP:    Lab Results   Component Value Date     01/11/2021    K 4.5 01/11/2021    K 4.3 04/15/2019    CL 95 01/11/2021    CO2 18 01/11/2021    BUN 67 01/11/2021    CREATININE 2.17 01/11/2021    GFRAA 34.9 01/11/2021    LABGLOM 28.8 01/11/2021    GLUCOSE 108 01/11/2021    PROT 6.5 01/09/2021    LABALBU 4.1 01/09/2021    CALCIUM 9.1 01/11/2021    BILITOT 1.3 01/09/2021    ALKPHOS 59 01/09/2021    AST 34 01/09/2021    ALT 18 01/09/2021     BMP:    Lab Results   Component Value Date     01/11/2021    K 4.5 01/11/2021    K 4.3 04/15/2019    CL 95 01/11/2021    CO2 18 01/11/2021    BUN 67 01/11/2021    LABALBU 4.1 01/09/2021    CREATININE 2.17 01/11/2021    CALCIUM 9.1 01/11/2021    GFRAA 34.9 01/11/2021    LABGLOM 28.8 01/11/2021    GLUCOSE 108 01/11/2021     Magnesium:  No results found for: MG  Troponin:    Lab Results   Component Value Date    TROPONINI 0.033 01/09/2021       Radiology:  Echocardiogram Complete 2d With Doppler With Color    Result Date: 1/9/2021 Transthoracic Echocardiography Report (TTE)  Demographics  Patient Name   Jorge Dhillon   Gender              Male                 Rosanna Seip.  Patient Number [id number]        Race                                                 Ethnicity  Visit Number   [id number]       Room Number         A359  Corporate ID                   Date of Study       01/09/2021  Accession      [id number]      Referring Physician  Number  Date of Birth  11/19/1932      Sonographer         Erasmo Duval Macario LEONIE Morrissey  Age            80 year(s)      Interpreting        [facility]) Cardiology                                 Physician           [address] [place] [address] Procedure Type of Study  TTE procedure:ECHO COMPLETE 2D W/DOP W/COLOR. Procedure Date Date: 01/09/2021 Start: 12:47 PM Study Location: Portable Technical Quality: Adequate visualization Indications:Shortness of breath and Congestive heart failure. Patient Status: Routine Height: 63 inches Weight: 161 pounds BSA: 1.76 m^2 BMI: 28.52 kg/m^2 BP: 133/99 mmHg  Conclusions  Summary  Moderately severe (3+) mitral regurgitation is present. S/P TAVR  Peak/mean gradient 17/7 mm Hg YASMINE 0.9 cm2  Normal tricuspid valve structure and function. There is mild to moderate ( 2 +) tricuspid regurgitation with estimated  RVSP of 58 mm Hg. Moderately dilated left atrium. Left ventricular ejection fraction is visually estimated at 15%. Abnormal (paradoxical) motion consistent with RV pacemaker. Restrictive filling pattern. Normal right ventricle structure and function. Pacer wire visualized in right ventricle. Signature  ----------------------------------------------------------------  Electronically signed by Angelica Khan(Interpreting physician)  on 01/09/2021 05:51 PM  ----------------------------------------------------------------  Findings Left Ventricle Left ventricular ejection fraction is visually estimated at 15%.  Abnormal (paradoxical) TR Gradient: 48.89 mmHg  Pulmonic Valve  Peak Velocity: 0.69 m/s             Peak Gradient: 1.91 mmHg  LVOT  Peak Velocity: 0.63 m/s              Mean Velocity: 0.37 m/s  Peak Gradient: 1.49 mmHg             Mean Gradient: 0.66 mmHg  LVOT Diameter: 1.98 cm               LVOT VTI: 9.6 cm Structures  Left Atrium  LA Dimension: 4.5 cm                          LA Area: 19.9 cm^2  LA/Aorta: 1.48  LA Volume/Index: 43.34 ml /25 m^2  Left Ventricle  Diastolic Dimension: 2.87 cm         Systolic Dimension: 1.82 cm  Septum Diastolic: 0.82 cm            Septum Systolic: 4.58 cm  PW Diastolic: 1.37 cm                PW Systolic: 2.11 cm                                       FS: 5.1 %  LV EDV/LV EDV Index: 218.7 ml/124    LV ESV/LV ESV Index: 194.95 ml/111  m^2                                  m^2  EF Calculated: 10.9 %  LVOT Diameter: 1.98 cm  Right Ventricle  Diastolic Dimension: 3.35 cm Aorta/ Miscellaneous Aorta  Aortic Root: 3.04 cm  LVOT Diameter: 1.98 cm    Xr Chest Portable    Result Date: 1/9/2021  Exam: XR CHEST PORTABLE History: CHF Technique: AP portable view of the chest obtained. Comparison: Portable chest radiograph from April 11, 2019 Findings: Suboptimal inspiration. Left-sided cardiac defibrillator is present. Atherosclerotic calcification of the thoracic aorta. Postsurgical changes of aortic valve replacement. Moderate cardiomegaly. Pulmonary vascular congestion. Possible small left pleural effusion. No pneumothorax or consolidation. No acute osseous abnormality. Degenerative changes of the spine. Cardiomegaly and pulmonary vascular congestion. Possible small left pleural effusion. EKG:  Assessment:    Active Hospital Problems    Diagnosis Date Noted    Acute diastolic heart failure ([address] [I50.31] 01/09/2021           Plan:  1. Increase the dose of Coreg to 25 twice daily  2.  Continue with gentle IV diuresis  Electronically signed by Sherren Laine, MD on 1/11/2021 at 9:20 AM

## 2021-01-11 NOTE — PROGRESS NOTES
/81   Pulse 90   Temp 97.9 °F (36.6 °C) (Oral)   Resp 18   Ht 5' 3\" (1.6 m)   Wt 162 lb 8 oz (73.7 kg)   SpO2 96%   BMI 28.79 kg/m²   Temp (24hrs), Av.6 °F (36.4 °C), Min:97.4 °F (36.3 °C), Max:97.9 °F (36.6 °C)      General appearance: Frail, chronically ill-appearing. Sleeping but will wake up and answer questions appropriately. Lungs: clear to auscultation bilaterally, normal effort  Heart: Irregularly irregular, no murmur  Abdomen: soft, nontender, nondistended, bowel sounds present, no masses  Extremities: 2+ pitting edema bilaterally  Skin: no gross lesions, rashes    Data:     Labs:  Recent Labs     21  1030 21  1149   WBC 10.3  --    HGB 9.6* 9.5*     --      Recent Labs     01/10/21  0557 21  0536 21  1149   * 130*  --    K 4.7 4.5  --    CL 95 95  --    CO2 18* 18*  --    BUN 63* 67*  --    CREATININE 2.14* 2.17* 2.2*   GLUCOSE 124* 108*  --      Recent Labs     21  1030   AST 34   ALT 18   BILITOT 1.3*   ALKPHOS 59       Echo:   Conclusions   Summary   Moderately severe (3+) mitral regurgitation is present. S/P TAVR   Peak/mean gradient 17/7 mm Hg YASMINE 0.9 cm2   Normal tricuspid valve structure and function. There is mild to moderate ( 2 +) tricuspid regurgitation with estimated   RVSP of 58 mm Hg. Moderately dilated left atrium. Left ventricular ejection fraction is visually estimated at 15%. Abnormal (paradoxical) motion consistent with RV pacemaker. Restrictive filling pattern. Normal right ventricle structure and function. Pacer wire visualized in right ventricle. Assessment and Plan:        29-year-old male with history of CAD, ischemic cardiomyopathy EF 15% (35% in Oct 2020) s/p AICD/CRT, chronic atrial fibrillation on Eliquis, TIA, severe AS s/p TAVR presented on  with worsening dyspnea. 1.  Acute decompensation of chronic systolic heart failure with worsening EF (35 -->15%) over the past few months in the setting of chronic atrial fibrillation  -Suboptimal diuresis thus far-cardiology to increase afterload control and gently increase frequency of diuresis. If he remains with poor urine output will consider adding metolazone or inotropic agent  -Entresto, carvedilol  -Anticoagulated with Eliquis  -Discussed goals of care with son: He will need to review with the patient's spouse his birthday is today. He does not feel the patient would want aggressive interventions however would need to have family discussion before updating CODE STATUS. In the past, family has declined elective cardioversion. -PT/OT    2.   PAYTON versus progression to CKD 3: Continue closely monitor renal function with diuresis     Diet: Dietary Nutrition Supplements: Standard High Calorie Oral Supplement  DIET CARDIAC; Low Sodium (2 GM)  Full Code    Dispo: inpatient    Electronically signed by Gabby Gonzalez DO on 1/11/2021 at 3:36 PM

## 2021-01-12 LAB
ANION GAP SERPL CALCULATED.3IONS-SCNC: 13 MEQ/L (ref 9–15)
ANISOCYTOSIS: ABNORMAL
BASOPHILS ABSOLUTE: 0 K/UL (ref 0–0.2)
BASOPHILS RELATIVE PERCENT: 0.4 %
BUN BLDV-MCNC: 71 MG/DL (ref 8–23)
CALCIUM SERPL-MCNC: 8.9 MG/DL (ref 8.5–9.9)
CHLORIDE BLD-SCNC: 92 MEQ/L (ref 95–107)
CO2: 21 MEQ/L (ref 20–31)
CREAT SERPL-MCNC: 1.91 MG/DL (ref 0.7–1.2)
EOSINOPHILS ABSOLUTE: 0 K/UL (ref 0–0.7)
EOSINOPHILS RELATIVE PERCENT: 0.1 %
GFR AFRICAN AMERICAN: 40.4
GFR NON-AFRICAN AMERICAN: 33.4
GLUCOSE BLD-MCNC: 103 MG/DL (ref 70–99)
HCT VFR BLD CALC: 29.3 % (ref 42–52)
HEMOGLOBIN: 9.1 G/DL (ref 14–18)
LYMPHOCYTES ABSOLUTE: 1.2 K/UL (ref 1–4.8)
LYMPHOCYTES RELATIVE PERCENT: 11.1 %
MAGNESIUM: 2.1 MG/DL (ref 1.7–2.4)
MCH RBC QN AUTO: 23.1 PG (ref 27–31.3)
MCHC RBC AUTO-ENTMCNC: 31.1 % (ref 33–37)
MCV RBC AUTO: 74.3 FL (ref 80–100)
MICROCYTES: ABNORMAL
MONOCYTES ABSOLUTE: 0.4 K/UL (ref 0.2–0.8)
MONOCYTES RELATIVE PERCENT: 4 %
NEUTROPHILS ABSOLUTE: 9 K/UL (ref 1.4–6.5)
NEUTROPHILS RELATIVE PERCENT: 84.4 %
OVALOCYTES: ABNORMAL
PDW BLD-RTO: 16.7 % (ref 11.5–14.5)
PLATELET # BLD: 140 K/UL (ref 130–400)
POIKILOCYTES: ABNORMAL
POLYCHROMASIA: ABNORMAL
POTASSIUM SERPL-SCNC: 4 MEQ/L (ref 3.4–4.9)
RBC # BLD: 3.94 M/UL (ref 4.7–6.1)
SODIUM BLD-SCNC: 126 MEQ/L (ref 135–144)
WBC # BLD: 10.6 K/UL (ref 4.8–10.8)

## 2021-01-12 PROCEDURE — 83735 ASSAY OF MAGNESIUM: CPT

## 2021-01-12 PROCEDURE — 99233 SBSQ HOSP IP/OBS HIGH 50: CPT | Performed by: INTERNAL MEDICINE

## 2021-01-12 PROCEDURE — 2580000003 HC RX 258: Performed by: INTERNAL MEDICINE

## 2021-01-12 PROCEDURE — 6360000002 HC RX W HCPCS: Performed by: INTERNAL MEDICINE

## 2021-01-12 PROCEDURE — 36415 COLL VENOUS BLD VENIPUNCTURE: CPT

## 2021-01-12 PROCEDURE — 6370000000 HC RX 637 (ALT 250 FOR IP): Performed by: INTERNAL MEDICINE

## 2021-01-12 PROCEDURE — 80048 BASIC METABOLIC PNL TOTAL CA: CPT

## 2021-01-12 PROCEDURE — 93010 ELECTROCARDIOGRAM REPORT: CPT | Performed by: INTERNAL MEDICINE

## 2021-01-12 PROCEDURE — 2060000000 HC ICU INTERMEDIATE R&B

## 2021-01-12 PROCEDURE — 97165 OT EVAL LOW COMPLEX 30 MIN: CPT

## 2021-01-12 PROCEDURE — 85025 COMPLETE CBC W/AUTO DIFF WBC: CPT

## 2021-01-12 RX ORDER — FUROSEMIDE 10 MG/ML
20 INJECTION INTRAMUSCULAR; INTRAVENOUS DAILY
Status: DISCONTINUED | OUTPATIENT
Start: 2021-01-13 | End: 2021-01-16

## 2021-01-12 RX ADMIN — APIXABAN 2.5 MG: 5 TABLET, FILM COATED ORAL at 08:27

## 2021-01-12 RX ADMIN — ATORVASTATIN CALCIUM 40 MG: 40 TABLET, FILM COATED ORAL at 19:57

## 2021-01-12 RX ADMIN — IRON SUCROSE 100 MG: 20 INJECTION, SOLUTION INTRAVENOUS at 18:11

## 2021-01-12 RX ADMIN — APIXABAN 2.5 MG: 5 TABLET, FILM COATED ORAL at 19:57

## 2021-01-12 RX ADMIN — CARVEDILOL 25 MG: 25 TABLET, FILM COATED ORAL at 08:26

## 2021-01-12 RX ADMIN — SODIUM CHLORIDE, PRESERVATIVE FREE 10 ML: 5 INJECTION INTRAVENOUS at 19:58

## 2021-01-12 RX ADMIN — FUROSEMIDE 20 MG: 10 INJECTION, SOLUTION INTRAVENOUS at 08:26

## 2021-01-12 RX ADMIN — CARVEDILOL 25 MG: 25 TABLET, FILM COATED ORAL at 18:11

## 2021-01-12 RX ADMIN — SODIUM CHLORIDE, PRESERVATIVE FREE 10 ML: 5 INJECTION INTRAVENOUS at 08:27

## 2021-01-12 RX ADMIN — SACUBITRIL AND VALSARTAN 1 TABLET: 24; 26 TABLET, FILM COATED ORAL at 19:57

## 2021-01-12 RX ADMIN — SACUBITRIL AND VALSARTAN 1 TABLET: 24; 26 TABLET, FILM COATED ORAL at 08:26

## 2021-01-12 ASSESSMENT — PAIN SCALES - GENERAL
PAINLEVEL_OUTOF10: 0

## 2021-01-12 NOTE — PLAN OF CARE
See OT evaluation for all goals and OT POC.  Electronically signed by Kallie Thakkar OT on 1/12/2021 at 9:57 AM

## 2021-01-12 NOTE — FLOWSHEET NOTE
PM assessment completed. VSS. Patient denies complaints of chest pain. Short of breath with exertion. Patient resting in bed. Voices no needs at this time.

## 2021-01-12 NOTE — PROGRESS NOTES
Home Layout: One level, Laundry in basement, Work area in basement, Able to Live on Main level with bedroom/bathroom(usually uses shower in basement)  Home Access: Stairs to enter with rails  Entrance Stairs - Number of Steps: 1  Entrance Stairs - Rails: Left  Bathroom Shower/Tub: Tub/Shower unit, Walk-in shower(Walk in shower in basement)  Bathroom Toilet: Handicap height  Bathroom Equipment: Grab bars in shower, Shower chair, Hand-held shower  Bathroom Accessibility: Walker accessible  Home Equipment: Cane, Rolling walker, Sock aid, Reacher(Mostly wife's equipment)  ADL Assistance: Independent  Homemaking Assistance: Independent  Homemaking Responsibilities: Yes  Ambulation Assistance: Independent(no AD)  Transfer Assistance: Independent  Active : Yes  Mode of Transportation: Car  Occupation: Retired  Type of occupation: Construction  Leisure & Hobbies: Use to go out often pre-pandemic, TV, Sports, reading  IADL Comments: Hires help to [employer]; Pt and wife share cleaning, laundry, cooking responsibilities  Additional Comments: Son and daughter local, 2 other sons out of state. Wife present for eval and reports that neighbor is also very helpful when needed. OBJECTIVE:     Orientation Status:  Orientation  Overall Orientation Status: Within Functional Limits    Observation:  Observation/Palpation  Posture: Fair  Observation: Pleasant and cooperative. Significant bruising to R UE.  Pt reports is from BP cuff    Cognition Status:  Cognition  Overall Cognitive Status: WFL    Perception Status:  Perception  Overall Perceptual Status: WFL    Sensation Status:  Sensation  Overall Sensation Status: WFL(denies numbness and tingling)    Vision and Hearing Status:  Vision  Vision: Impaired  Vision Exceptions: Wears glasses for reading  Hearing  Hearing: Within functional limits     ROM:   LUE AROM (degrees)  LUE AROM : WFL  Left Hand AROM (degrees)  Left Hand AROM: WFL  RUE AROM (degrees)  RUE AROM : Exceptions RUE General AROM: Shoulder flexion/abduction limited to ~20-30 degrees d/t report of RTC injury, unrepaired. Pt compensates with scapular elevation and trunk movements. R Elbow/forearm AROM WFL. Right Hand AROM (degrees)  Right Hand AROM: WFL    Strength:  LUE Strength  L Hand General: 4-/5  LUE Strength Comment: 4-/5 grossly assessed  RUE Strength  Gross RUE Strength: Exceptions to [facility]  R Hand General: 4-/5  RUE Strength Comment: RUE MMT not performed d/t pt's shoulder injury. Fair - shoulder strength anticipated per functional observation. Coordination, Tone, Quality of Movement: Tone RUE  RUE Tone: Normotonic  Tone LUE  LUE Tone: Normotonic  Coordination  Movements Are Fluid And Coordinated: No  Coordination and Movement description: Fine motor impairments, Decreased speed, Right UE, Left UE    Hand Dominance:  Hand Dominance  Hand Dominance: Right    ADL Status:  ADL  Feeding: Modified independent   Grooming: Modified independent   UE Bathing: Modified independent   LE Bathing: Supervision  UE Dressing: Supervision  LE Dressing: Supervision  Toileting: Supervision  Additional Comments: ADLs simulated with anticipated levels listed above. Pt able to don/doff sock seated in chair with increase effort and time. Toilet Transfers  Toilet - Technique: Ambulating  Equipment Used: Grab bars  Toilet Transfer: Modified independent  Toilet Transfers Comments: Increased time to complete.  Pt is slow but steady       Therapy key for assistance levels    Independent = Pt. is able to perform task with no assistance but may require a device   Stand by assistance = Pt. does not perform task at an independent level but does not need physical assistance, requires verbal cues  Minimal, Moderate, Maximal Assistance = Pt. requires physical assistance (25%, 50%, 75% assist from helper) for task but is able to actively participate in task Dependent = Pt. requires total assistance with task and is not able to actively participate with task completion     Functional Mobility:  Functional Mobility  Functional - Mobility Device: No device  Activity: To/from bathroom  Assist Level: Supervision  Functional Mobility Comments: Moves slowly but steady, no LOB  Transfers  Sit to stand: Modified independent  Stand to sit: Modified independent  Transfer Comments: Increased time to complete. Difficulty pushing off armrest with R UE. Bed Mobility  Bed mobility  Supine to Sit: Unable to assess  Sit to Supine: Unable to assess  Comment: Pt up in chair upon therapist arrival and returned to chair end of eval.    Seated and Standing Balance:  Balance  Sitting Balance: Independent  Standing Balance: Modified independent     Functional Endurance:  Activity Tolerance  Activity Tolerance: Patient Tolerated treatment well  Activity Tolerance: Fair; Pt reports fatigue. D/C Recommendations:  OT D/C RECOMMENDATIONS  REQUIRES OT FOLLOW UP: Yes    Equipment Recommendations:  OT Equipment Recommendations  Equipment Needed: No   Pt's wife has dressing AE that pt would be able to use. Pt will benefit from AE education. OT Education:   OT Education  OT Education: IADL Safety, OT Role, Plan of Care, Family Education, Equipment  Patient Education: Therapist educated pt and wife on safety with ADLs/IADLs. Pt was encouraged to use bathroom and laundry on main level vs. basement upon return to home. Pt was also encouraged to use dressing AE as needed for energy conservation. Barriers to Learning: None known.     OT Follow Up:  OT D/C RECOMMENDATIONS  REQUIRES OT FOLLOW UP: Yes       Assessment/Discharge Disposition: Assessment: Pt is 79 y/o male who came to [facility] from home with wife with c/o SOB. Pt presents with the above functional deficits impacting his safety and IND with ADLs/IADLs. Pt will benefit from OT services to increase functional endurance and IND with ADLs and functional t/fs. Performance deficits / Impairments: Decreased functional mobility , Decreased ADL status, Decreased endurance, Decreased high-level IADLs, Decreased strength, Decreased safe awareness  Prognosis: Good  Discharge Recommendations: Continue to assess pending progress, Home with assist PRN  Decision Making: Low Complexity  History: 4 comorbidities  Exam: 6 performance deficits  Assistance / Modification: SBA/SUP    Six Click Score   How much help for putting on and taking off regular lower body clothing?: A Little  How much help for Bathing?: A Little  How much help for Toileting?: A Little  How much help for putting on and taking off regular upper body clothing?: A Little  How much help for taking care of personal grooming?: None  How much help for eating meals?: None  AM-[facility] Inpatient Daily Activity Raw Score: 20  AM-PAC Inpatient ADL T-Scale Score : 42.03  ADL Inpatient CMS 0-100% Score: 38.32    Plan:  Plan  Times per week: 1-3x  Plan weeks: acute length of stay  Current Treatment Recommendations: Strengthening, Safety Education & Training, Self-Care / ADL, Patient/Caregiver Education & Training, Functional Mobility Training, Equipment Evaluation, Education, & procurement, Endurance Training    Goals:   Patient will:    - Improve functional endurance to tolerate/complete 30 mins of ADL's  - Be Mod I in LB ADLs  - Be Mod I in ADL transfers without LOB  - Be Mod I in toileting tasks    Patient Goal: Patient goals : \"To feel alot better and less tired than I was. \"     Discussed and agreed upon: Yes Comments:     Therapy Time:   OT Individual Minutes  Time In: 7274  Time Out: 1467  Minutes: 30    Eval: 30 minutes Electronically signed by:    BUD Bah/L  1/12/2021, 9:47 AM

## 2021-01-12 NOTE — PROGRESS NOTES
Physical Therapy Med Surg Initial Assessment  Facility/Department: [facility]  [id number]       NAME: Sapna Cho. : 1932 (80 y.o.)  MRN: [id number]  CODE STATUS: Full Code    Date of Service: 2021    Patient Diagnosis(es): Acute diastolic heart failure ([address] [I50.31]   Chief Complaint   Patient presents with    Shortness of Breath     pt c/o SOB that started this AM     Patient Active Problem List    Diagnosis Date Noted    Acute ischemic stroke [facility])      Priority: High    Acute diastolic heart failure ([address] 2021    Primary insomnia 2021    Memory loss 2021    Essential hypertension 2020    Atrial fibrillation, persistent ([address] 2019    Acute CVA (cerebrovascular accident) ([address] 2019    Hepatic lesion 2018    Retention of urine 2017    Dysuria 2017    Low sodium levels 2017    Chronic systolic CHF (congestive heart failure) ([address] 2017    Nonrheumatic aortic valve stenosis 2017    S/P TAVR (transcatheter aortic valve replacement) 2017    S/P hip replacement 2017    OA (osteoarthritis) of hip 2016    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  Pt reports that he is from home with his wife of nearly 70 years    Pain  Pre Treatment Pain Screening  Pain at present: 0    Post Treatment Pain Screening:   Pain Screening  Patient Currently in Pain: Denies Pain Assessment  Pain Level: 0    Prior Level of Function:  Social/Functional History  Lives With: Spouse  Type of Home: House  Home Layout: One level, Laundry in basement, Work area in basement, Able to [employer] on Main level with bedroom/bathroom(usually uses shower in basement)  Home Access: Stairs to enter with rails  Entrance Stairs - Number of Steps: 1+1  Entrance Stairs - Rails: Left  Bathroom Shower/Tub: Tub/Shower unit, Walk-in shower(Walk in shower in basement)  Bathroom Toilet: Handicap height  Bathroom Equipment: Grab bars in shower, Shower chair, Hand-held shower  Bathroom Accessibility: Walker accessible  Home Equipment: Cane, Rolling walker, Sock aid, Reacher(Mostly wife's equipment)  ADL Assistance: [address] [place] [address] Avenue: Independent  Homemaking Responsibilities: Yes  Ambulation Assistance: Independent(no AD)  Transfer Assistance: Independent  Active : Yes  Mode of Transportation: Car  Occupation: Retired  Type of occupation: Construction  Leisure & Hobbies: Use to go out often pre-pandemic, TV, Sports, reading  IADL Comments: Hires help to [employer]; Pt and wife share cleaning, laundry, cooking responsibilities  Additional Comments: Son and daughter local, 2 other sons out of state.     OBJECTIVE:   Vision: Impaired  Vision Exceptions: Wears glasses for reading  Hearing: Exceptions to [facility]  Hearing Exceptions: Hard of hearing/hearing concerns    Cognition:  Overall Orientation Status: Within Functional Limits  Follows Commands: Within Functional Limits    Observation/Palpation  Observation: resting in bed upon arrival, no acute distress, on RA    ROM:  RLE General PROM: impaired HF/HS flexibility noted but WFL for age  RLE AROM: WFL  LLE General PROM: impaired HF/HS flexibility noted but WFL for age  LLE AROM : WFL    Strength:  Strength RLE  Comment: grossly 3+/5 in hip, 4/5 knee and ankle  Strength LLE  Comment: grossly 3+/5 in hip, 4/5 knee and ankle    Neuro:  Balance Posture: Fair(agre related postural changes, flexed posture)  Sitting - Static: Good  Sitting - Dynamic: Good  Standing - Static: Good  Standing - Dynamic: Good;-     Motor Control  Gross Motor?: WFL  Sensation  Overall Sensation Status: WFL(pt reports mild numbness d/t swelling in B feet)    Bed mobility  Supine to Sit: Modified independent  Sit to Supine: Modified independent    Transfers  Sit to Stand: Supervision  Stand to sit: Supervision    Ambulation  Ambulation?: Yes  Ambulation 1  Surface: level tile  Device: No Device  Assistance: Supervision  Quality of Gait: age related postural changes  Gait Deviations: Slow Blanche  Distance: 45ft  x2  Comments: steady and reciprocal gait, navigating room environment, pulling curtain, navigating doors/doorways to restroom, good safety awareness    Activity Tolerance  Activity Tolerance: Patient Tolerated treatment well      PT Education  PT Education: PT Role;Plan of Care;General Safety    ASSESSMENT:   Decision Making: Low Complexity  History: med  Exam: low  Clinical Presentation: lo  No Skilled PT: Safe to return home    Prognosis: Good  Barriers to Learning: none    DISCHARGE RECOMMENDATIONS:  No Skilled PT: Safe to return home    Assessment: Pt demonstrates safe functional mobility in room environment without need for AD on flat tile surface. Pt demonstrates good safety awareness and insight into safety.  Pt ed in recommendation for progressive ambulation to tolerance to decrease risks of functional decline during hospitalization  REQUIRES PT FOLLOW UP: No      PLAN OF CARE:  Plan  Times per week: eval only  Safety Devices  Type of devices: Left in bed, Call light within reach, Bed alarm in place    Goals:  Patient goals : \"go home\"    [facility] (6 CLICK) [address] Mobility Raw Score : 21   Therapy Time:   Individual   Time In 1422   Time Out 1440   Minutes [address], [place], 01/12/21 at 3:33 PM Definitions for assistance levels  Independent = pt does not require any physical supervision or assistance from another person for activity completion. Device may be needed.   Stand by assistance = pt requires verbal cues or instructions from another person, close to but not touching, to perform the activity  Minimal assistance= pt performs 75% or more of the activity; assistance is required to complete the activity  Moderate assistance= pt performs 50% of the activity; assistance is required to complete the activity  Maximal assistance = pt performs 25% of the activity; assistance is required to complete the activity  Dependent = pt requires total physical assistance to accomplish the task

## 2021-01-12 NOTE — PROGRESS NOTES
General appearance: Frail, chronically ill-appearing. Sleeping but will wake up and answer questions appropriately. Lungs: clear to auscultation bilaterally, normal effort  Heart: Irregularly irregular, no murmur  Abdomen: soft, nontender, nondistended, bowel sounds present, no masses  Extremities: 2+ pitting edema bilaterally  Skin: no gross lesions, rashes    Data:     Labs:  Recent Labs     01/11/21  1149 01/12/21  0541   WBC  --  10.6   HGB 9.5* 9.1*   PLT  --  140     Recent Labs     01/11/21  0536 01/11/21  1149 01/12/21  0541   *  --  126*   K 4.5  --  4.0   CL 95  --  92*   CO2 18*  --  21   BUN 67*  --  71*   CREATININE 2.17* 2.2* 1.91*   GLUCOSE 108*  --  103*     No results for input(s): AST, ALT, ALB, BILITOT, ALKPHOS in the last 72 hours. Echo:   Conclusions   Summary   Moderately severe (3+) mitral regurgitation is present. S/P TAVR   Peak/mean gradient 17/7 mm Hg YASMINE 0.9 cm2   Normal tricuspid valve structure and function. There is mild to moderate ( 2 +) tricuspid regurgitation with estimated   RVSP of 58 mm Hg. Moderately dilated left atrium. Left ventricular ejection fraction is visually estimated at 15%. Abnormal (paradoxical) motion consistent with RV pacemaker. Restrictive filling pattern. Normal right ventricle structure and function. Pacer wire visualized in right ventricle. Assessment and Plan:        22-year-old male with history of CAD, ischemic cardiomyopathy EF 15% (35% in Oct 2020) s/p AICD/CRT, chronic atrial fibrillation on Eliquis, TIA, severe AS s/p TAVR presented on 1/9 with worsening dyspnea. 1.  Acute decompensation of chronic systolic heart failure with worsening EF (35 -->15%) over the past few months in the setting of chronic atrial fibrillation  -Diuresis improved.  Continue IV diuresis  -Entresto, carvedilol  -Anticoagulated with Eliquis  -PT/OT  -We will continue to discuss goals of care 2.  PAYTON versus progression to CKD 3: Continue closely monitor renal function with diuresis     Diet: Dietary Nutrition Supplements: Standard High Calorie Oral Supplement  DIET LOW SODIUM 2 GM;  Full Code     Dispo: inpatient    >35min time in total care    Electronically signed by Jimmy Limon DO on 1/12/2021 at 4:03 PM

## 2021-01-13 PROBLEM — I50.21 ACUTE SYSTOLIC CHF (CONGESTIVE HEART FAILURE) (HCC): Status: ACTIVE | Noted: 2021-01-13

## 2021-01-13 PROBLEM — I50.31 ACUTE DIASTOLIC HEART FAILURE (HCC): Status: RESOLVED | Noted: 2021-01-09 | Resolved: 2021-01-13

## 2021-01-13 LAB
ANION GAP SERPL CALCULATED.3IONS-SCNC: 13 MEQ/L (ref 9–15)
BUN BLDV-MCNC: 67 MG/DL (ref 8–23)
CALCIUM SERPL-MCNC: 9.1 MG/DL (ref 8.5–9.9)
CHLORIDE BLD-SCNC: 96 MEQ/L (ref 95–107)
CO2: 22 MEQ/L (ref 20–31)
CREAT SERPL-MCNC: 1.38 MG/DL (ref 0.7–1.2)
GFR AFRICAN AMERICAN: 58.8
GFR NON-AFRICAN AMERICAN: 48.6
GLUCOSE BLD-MCNC: 151 MG/DL (ref 70–99)
MAGNESIUM: 2.1 MG/DL (ref 1.7–2.4)
POTASSIUM SERPL-SCNC: 4.1 MEQ/L (ref 3.4–4.9)
SODIUM BLD-SCNC: 131 MEQ/L (ref 135–144)

## 2021-01-13 PROCEDURE — 36415 COLL VENOUS BLD VENIPUNCTURE: CPT

## 2021-01-13 PROCEDURE — APPNB30 APP NON BILLABLE TIME 0-30 MINS: Performed by: PHYSICIAN ASSISTANT

## 2021-01-13 PROCEDURE — 6360000002 HC RX W HCPCS: Performed by: PHYSICIAN ASSISTANT

## 2021-01-13 PROCEDURE — 6360000002 HC RX W HCPCS: Performed by: INTERNAL MEDICINE

## 2021-01-13 PROCEDURE — 80048 BASIC METABOLIC PNL TOTAL CA: CPT

## 2021-01-13 PROCEDURE — 6370000000 HC RX 637 (ALT 250 FOR IP): Performed by: INTERNAL MEDICINE

## 2021-01-13 PROCEDURE — 99232 SBSQ HOSP IP/OBS MODERATE 35: CPT | Performed by: INTERNAL MEDICINE

## 2021-01-13 PROCEDURE — 83735 ASSAY OF MAGNESIUM: CPT

## 2021-01-13 PROCEDURE — 2060000000 HC ICU INTERMEDIATE R&B

## 2021-01-13 PROCEDURE — 2580000003 HC RX 258: Performed by: INTERNAL MEDICINE

## 2021-01-13 PROCEDURE — 6370000000 HC RX 637 (ALT 250 FOR IP): Performed by: PHYSICIAN ASSISTANT

## 2021-01-13 RX ORDER — LANOLIN ALCOHOL/MO/W.PET/CERES
3 CREAM (GRAM) TOPICAL NIGHTLY
Status: DISCONTINUED | OUTPATIENT
Start: 2021-01-13 | End: 2021-01-15

## 2021-01-13 RX ORDER — METOLAZONE 2.5 MG/1
2.5 TABLET ORAL ONCE
Status: COMPLETED | OUTPATIENT
Start: 2021-01-13 | End: 2021-01-13

## 2021-01-13 RX ORDER — LANOLIN ALCOHOL/MO/W.PET/CERES
3 CREAM (GRAM) TOPICAL NIGHTLY PRN
Status: DISCONTINUED | OUTPATIENT
Start: 2021-01-13 | End: 2021-01-13

## 2021-01-13 RX ORDER — FUROSEMIDE 10 MG/ML
40 INJECTION INTRAMUSCULAR; INTRAVENOUS ONCE
Status: COMPLETED | OUTPATIENT
Start: 2021-01-13 | End: 2021-01-13

## 2021-01-13 RX ADMIN — SODIUM CHLORIDE, PRESERVATIVE FREE 10 ML: 5 INJECTION INTRAVENOUS at 21:31

## 2021-01-13 RX ADMIN — APIXABAN 5 MG: 5 TABLET, FILM COATED ORAL at 21:31

## 2021-01-13 RX ADMIN — FUROSEMIDE 40 MG: 10 INJECTION, SOLUTION INTRAMUSCULAR; INTRAVENOUS at 15:48

## 2021-01-13 RX ADMIN — SACUBITRIL AND VALSARTAN 1 TABLET: 24; 26 TABLET, FILM COATED ORAL at 21:31

## 2021-01-13 RX ADMIN — METOLAZONE 2.5 MG: 2.5 TABLET ORAL at 17:44

## 2021-01-13 RX ADMIN — CARVEDILOL 25 MG: 25 TABLET, FILM COATED ORAL at 08:18

## 2021-01-13 RX ADMIN — CARVEDILOL 25 MG: 25 TABLET, FILM COATED ORAL at 15:48

## 2021-01-13 RX ADMIN — SACUBITRIL AND VALSARTAN 1 TABLET: 24; 26 TABLET, FILM COATED ORAL at 08:18

## 2021-01-13 RX ADMIN — Medication 3 MG: at 21:31

## 2021-01-13 RX ADMIN — ATORVASTATIN CALCIUM 40 MG: 40 TABLET, FILM COATED ORAL at 21:31

## 2021-01-13 RX ADMIN — APIXABAN 2.5 MG: 5 TABLET, FILM COATED ORAL at 08:18

## 2021-01-13 RX ADMIN — FUROSEMIDE 20 MG: 10 INJECTION, SOLUTION INTRAVENOUS at 08:18

## 2021-01-13 RX ADMIN — SODIUM CHLORIDE, PRESERVATIVE FREE 10 ML: 5 INJECTION INTRAVENOUS at 08:18

## 2021-01-13 ASSESSMENT — PAIN SCALES - GENERAL
PAINLEVEL_OUTOF10: 0

## 2021-01-13 ASSESSMENT — ENCOUNTER SYMPTOMS
SHORTNESS OF BREATH: 1
ABDOMINAL PAIN: 0
COLOR CHANGE: 0
VOMITING: 0
CHEST TIGHTNESS: 0
NAUSEA: 0

## 2021-01-13 NOTE — CARE COORDINATION
This Care Transition Nurse provided CHF booklet and zones sheet and reviewed. Discussed the use of zones sheet. Stressed importance of phoning physician promptly for symptoms in the yellow zone and ems for symptoms in the red zone. Discussed causes of CHF, symptoms, daily weights, BP management. Explained the importance of weighing self daily and keeping a log of weight and blood pressure. Phone cardiologist for weight gain of 3 or more pounds in 1-7 days, sob, edema, fatigue, increased cough, and importance of following a low sodium diet, fluids to consider if restricted, medications, vaccines, cardiac rehab. Explained the importance of  reading food labels for sodium content and not consume more than 2000 mg per day. Made aware that dietician will probably be meeting with him to discuss low sodium diet in more detail. Discussed foods typically high is sodium to avoid. Use salt free seasongings such as Mrs. Kingston or recipes for salt free seasonings included in the booklet. Refer to \"Foods Recommended and Foods to Avoid section of book. Stressed importance of follow up with PCP within one week of discharge and cardiologist as directed. Patient voiced understanding. Patient states he has had CHF \"for a long time. \" He does weigh himself daily but does not know when to phone the cardiologist. Reinforced need to phone cardiologist for weight gain of 3 or more pounds in 1-7 days, shortness of breath, edema. He does not salt his foods. He does eat some processed foods. He likes salami and ham. Advised that these are very high in sodium and should be avoided. He is compliant with taking medications as ordered.

## 2021-01-13 NOTE — CARE COORDINATION
MARYSOL RANGEL AT [facility] PCP AND THEY WILL NOT FOLLOW [facility] UNTIL PATIENT HAS AN APPT ARRANGED ON THE BOOKS.   AWARE AND WILL TRY TO ARRANGE PCP APPT

## 2021-01-13 NOTE — PROGRESS NOTES
Physician Progress Note    1/13/2021   3:30 PM    Name:  Stacie Almonte. MRN:    [id number]      Day: 4     Admit Date: 1/9/2021  9:49 AM  PCP: Zane Hansen MD    Code Status:  Full Code    Subjective:     He had some dyspnea this morning. Now doing ok. Urinating frequently after lasix- inaccurate I/O / weight.      Current Facility-Administered Medications   Medication Dose Route Frequency Provider Last Rate Last Admin    apixaban (ELIQUIS) tablet 5 mg  5 mg Oral BID Alysa Guajardo MD        furosemide (LASIX) injection 40 mg  40 mg Intravenous Once Teresa Siddiqui        melatonin tablet 3 mg  3 mg Oral Nightly Mauricio Key DO        furosemide (LASIX) injection 20 mg  20 mg Intravenous Daily Annette Spivey MD   20 mg at 01/13/21 0818    carvedilol (COREG) tablet 25 mg  25 mg Oral BID WC Annette Spivey MD   25 mg at 01/13/21 0818    atorvastatin (LIPITOR) tablet 40 mg  40 mg Oral Nightly Alysa Guajardo MD   40 mg at 01/12/21 1957    sacubitril-valsartan (ENTRESTO) 24-26 MG per tablet 1 tablet  1 tablet Oral BID Alysa Guajardo MD   1 tablet at 01/13/21 0818    sodium chloride flush 0.9 % injection 10 mL  10 mL Intravenous 2 times per day Alysa Guajardo MD   10 mL at 01/13/21 0818    sodium chloride flush 0.9 % injection 10 mL  10 mL Intravenous PRN Alysa Guajardo MD        promethazine (PHENERGAN) tablet 12.5 mg  12.5 mg Oral Q6H PRN Alysa Guajardo MD        Or    [facility]etron [facility]) injection 4 mg  4 mg Intravenous Q6H PRN Alysa Guajardo MD        polyethylene glycol (GLYCOLAX) packet 17 g  17 g Oral Daily PRN Alysa Guajardo MD        acetaminophen (TYLENOL) tablet 650 mg  650 mg Oral Q6H PRN Alysa Guajardo MD   650 mg at 01/11/21 0601    Or    acetaminophen (TYLENOL) suppository 650 mg  650 mg Rectal Q6H PRN Alysa Guajardo MD           Physical Examination:      Vitals: -Continue IV diuresis per cardiology  -Entresto, carvedilol  -Anticoagulated with Eliquis  -PT/OT  -We will continue to discuss goals of care    2. Cardiorenal PAYTON on CKD 3: Improved.  Continue closely monitor renal function with diuresis     Diet: Dietary Nutrition Supplements: Standard High Calorie Oral Supplement  DIET LOW SODIUM 2 GM;  Full Code     Dispo: inpatient    >35min time in total care    Electronically signed by Susy Torres DO on 1/13/2021 at 3:30 PM

## 2021-01-13 NOTE — PROGRESS NOTES
Constitutional: Positive for fatigue. Negative for activity change and fever. HENT: Negative for congestion. Respiratory: Positive for shortness of breath. Negative for chest tightness. Cardiovascular: Positive for leg swelling. Negative for chest pain and palpitations. +orthopnea     Gastrointestinal: Negative for abdominal pain, nausea and vomiting. Genitourinary: Negative for difficulty urinating. Musculoskeletal: Negative for arthralgias. Skin: Negative for color change, pallor and rash. Neurological: Negative for dizziness and syncope. Psychiatric/Behavioral: Negative for agitation. Physical Examination:    /82   Pulse 76   Temp 97.4 °F (36.3 °C) (Oral)   Resp 18   Ht 5' 3\" (1.6 m)   Wt 165 lb 6.4 oz (75 kg)   SpO2 96%   BMI 29.30 kg/m²    Physical Exam  Constitutional:       General: He is not in acute distress. Appearance: Normal appearance. HENT:      Head: Normocephalic and atraumatic. Neck:      Musculoskeletal: Normal range of motion and neck supple. Cardiovascular:      Rate and Rhythm: Normal rate. Rhythm irregularly irregular. Pulmonary:      Effort: Pulmonary effort is normal. No respiratory distress. Breath sounds: No wheezing, rhonchi or rales. Abdominal:      Palpations: Abdomen is soft. Tenderness: There is no abdominal tenderness. Musculoskeletal:      Right lower leg: Edema (3+) present. Left lower leg: Edema (3-4+) present. Comments: LE pitting edema extending into thighs   Skin:     General: Skin is warm and dry. Neurological:      General: No focal deficit present. Mental Status: He is alert and oriented to person, place, and time. Cranial Nerves: No cranial nerve deficit.    Psychiatric:         Mood and Affect: Mood normal.         Behavior: Behavior normal.         LABS:  CBC:   Lab Results   Component Value Date    WBC 10.6 01/12/2021    RBC 3.94 01/12/2021    HGB 9.1 01/12/2021 HCT 29.3 01/12/2021    MCV 74.3 01/12/2021    MCH 23.1 01/12/2021    MCHC 31.1 01/12/2021    RDW 16.7 01/12/2021     01/12/2021     CBC with Differential:   Lab Results   Component Value Date    WBC 10.6 01/12/2021    RBC 3.94 01/12/2021    HGB 9.1 01/12/2021    HCT 29.3 01/12/2021     01/12/2021    MCV 74.3 01/12/2021    MCH 23.1 01/12/2021    MCHC 31.1 01/12/2021    RDW 16.7 01/12/2021    LYMPHOPCT 11.1 01/12/2021    MONOPCT 4.0 01/12/2021    BASOPCT 0.4 01/12/2021    MONOSABS 0.4 01/12/2021    LYMPHSABS 1.2 01/12/2021    EOSABS 0.0 01/12/2021    BASOSABS 0.0 01/12/2021     CMP:    Lab Results   Component Value Date     01/13/2021    K 4.1 01/13/2021    K 4.3 04/15/2019    CL 96 01/13/2021    CO2 22 01/13/2021    BUN 67 01/13/2021    CREATININE 1.38 01/13/2021    GFRAA 58.8 01/13/2021    LABGLOM 48.6 01/13/2021    GLUCOSE 151 01/13/2021    PROT 6.5 01/09/2021    LABALBU 4.1 01/09/2021    CALCIUM 9.1 01/13/2021    BILITOT 1.3 01/09/2021    ALKPHOS 59 01/09/2021    AST 34 01/09/2021    ALT 18 01/09/2021     BMP:    Lab Results   Component Value Date     01/13/2021    K 4.1 01/13/2021    K 4.3 04/15/2019    CL 96 01/13/2021    CO2 22 01/13/2021    BUN 67 01/13/2021    LABALBU 4.1 01/09/2021    CREATININE 1.38 01/13/2021    CALCIUM 9.1 01/13/2021    GFRAA 58.8 01/13/2021    LABGLOM 48.6 01/13/2021    GLUCOSE 151 01/13/2021     Magnesium:    Lab Results   Component Value Date    MG 2.1 01/13/2021     Troponin:    Lab Results   Component Value Date    TROPONINI 0.033 01/09/2021       Radiology:  No results found. Echocardiogram 1/9/21:  Conclusions   Summary   Moderately severe (3+) mitral regurgitation is present. S/P TAVR   Peak/mean gradient 17/7 mm Hg YASMINE 0.9 cm2   Normal tricuspid valve structure and function. There is mild to moderate ( 2 +) tricuspid regurgitation with estimated   RVSP of 58 mm Hg. Moderately dilated left atrium. 2. Cardiac/less than 2 g sodium diet recommended  3. Check daily weight and strict intake and output  4. 1500 mL daily fluid restriction  5. Monitor on telemetry for any tachycardia or bradycardia arrhythmias  6. Maintain potassium greater than 4, magnesium greater than 2  7. GI/DVT prophylaxis  8. Coronary evaluation per Dr. Phong Ferrari to consider ischemic evaluation in future given worsening LV function  9. Maintain outpatient follow-up with [facility] cardiology upon discharge. Patient wishes to continue to follow with [facility] cardiology, Dr. Joel Trivedi upon discharge. He was offered follow-up with [facility] heart failure clinic which he is declining currently.   10. Further recommendations to follow        Electronically signed by ClaytonMydish SquILAN strong on 1/13/2021 at 1:15 PM

## 2021-01-13 NOTE — CARE COORDINATION
SPOKE WITH PATIENT AND HE WOULD LIKE C AT [facility]. FREEDOM OF CHOICE OFFERED AND WOULD LIKE [facility]. IMM REVIEWED AND VERBALIZED UNDERSTANDING.

## 2021-01-14 LAB
ANION GAP SERPL CALCULATED.3IONS-SCNC: 13 MEQ/L (ref 9–15)
BUN BLDV-MCNC: 58 MG/DL (ref 8–23)
CALCIUM SERPL-MCNC: 8.9 MG/DL (ref 8.5–9.9)
CHLORIDE BLD-SCNC: 93 MEQ/L (ref 95–107)
CO2: 24 MEQ/L (ref 20–31)
CREAT SERPL-MCNC: 1.18 MG/DL (ref 0.7–1.2)
GFR AFRICAN AMERICAN: >60
GFR NON-AFRICAN AMERICAN: 58.2
GLUCOSE BLD-MCNC: 100 MG/DL (ref 70–99)
GLUCOSE BLD-MCNC: 161 MG/DL (ref 60–115)
MAGNESIUM: 1.9 MG/DL (ref 1.7–2.4)
PERFORMED ON: ABNORMAL
POTASSIUM SERPL-SCNC: 3.4 MEQ/L (ref 3.4–4.9)
PRO-BNP: NORMAL PG/ML
SODIUM BLD-SCNC: 130 MEQ/L (ref 135–144)

## 2021-01-14 PROCEDURE — 2580000003 HC RX 258: Performed by: INTERNAL MEDICINE

## 2021-01-14 PROCEDURE — 99233 SBSQ HOSP IP/OBS HIGH 50: CPT | Performed by: INTERNAL MEDICINE

## 2021-01-14 PROCEDURE — 36415 COLL VENOUS BLD VENIPUNCTURE: CPT

## 2021-01-14 PROCEDURE — 97535 SELF CARE MNGMENT TRAINING: CPT

## 2021-01-14 PROCEDURE — 2060000000 HC ICU INTERMEDIATE R&B

## 2021-01-14 PROCEDURE — 80048 BASIC METABOLIC PNL TOTAL CA: CPT

## 2021-01-14 PROCEDURE — 6370000000 HC RX 637 (ALT 250 FOR IP): Performed by: PHYSICIAN ASSISTANT

## 2021-01-14 PROCEDURE — 6370000000 HC RX 637 (ALT 250 FOR IP): Performed by: INTERNAL MEDICINE

## 2021-01-14 PROCEDURE — 6360000002 HC RX W HCPCS: Performed by: INTERNAL MEDICINE

## 2021-01-14 PROCEDURE — 2580000003 HC RX 258: Performed by: PHYSICIAN ASSISTANT

## 2021-01-14 PROCEDURE — 6360000002 HC RX W HCPCS: Performed by: PHYSICIAN ASSISTANT

## 2021-01-14 PROCEDURE — 83735 ASSAY OF MAGNESIUM: CPT

## 2021-01-14 PROCEDURE — 83880 ASSAY OF NATRIURETIC PEPTIDE: CPT

## 2021-01-14 RX ORDER — POTASSIUM CHLORIDE 20 MEQ/1
20 TABLET, EXTENDED RELEASE ORAL
Status: DISCONTINUED | OUTPATIENT
Start: 2021-01-14 | End: 2021-01-16 | Stop reason: HOSPADM

## 2021-01-14 RX ORDER — METOLAZONE 5 MG/1
2.5 TABLET ORAL ONCE
Status: COMPLETED | OUTPATIENT
Start: 2021-01-14 | End: 2021-01-14

## 2021-01-14 RX ORDER — FUROSEMIDE 10 MG/ML
40 INJECTION INTRAMUSCULAR; INTRAVENOUS ONCE
Status: COMPLETED | OUTPATIENT
Start: 2021-01-14 | End: 2021-01-14

## 2021-01-14 RX ADMIN — APIXABAN 5 MG: 5 TABLET, FILM COATED ORAL at 08:37

## 2021-01-14 RX ADMIN — SACUBITRIL AND VALSARTAN 1 TABLET: 24; 26 TABLET, FILM COATED ORAL at 08:37

## 2021-01-14 RX ADMIN — APIXABAN 5 MG: 5 TABLET, FILM COATED ORAL at 21:29

## 2021-01-14 RX ADMIN — SODIUM CHLORIDE, PRESERVATIVE FREE 10 ML: 5 INJECTION INTRAVENOUS at 08:38

## 2021-01-14 RX ADMIN — CARVEDILOL 25 MG: 25 TABLET, FILM COATED ORAL at 08:37

## 2021-01-14 RX ADMIN — IRON SUCROSE 100 MG: 20 INJECTION, SOLUTION INTRAVENOUS at 17:40

## 2021-01-14 RX ADMIN — ATORVASTATIN CALCIUM 40 MG: 40 TABLET, FILM COATED ORAL at 21:29

## 2021-01-14 RX ADMIN — FUROSEMIDE 40 MG: 10 INJECTION, SOLUTION INTRAMUSCULAR; INTRAVENOUS at 15:18

## 2021-01-14 RX ADMIN — Medication 3 MG: at 21:29

## 2021-01-14 RX ADMIN — SACUBITRIL AND VALSARTAN 1 TABLET: 24; 26 TABLET, FILM COATED ORAL at 21:31

## 2021-01-14 RX ADMIN — METOLAZONE 2.5 MG: 5 TABLET ORAL at 15:15

## 2021-01-14 RX ADMIN — SODIUM CHLORIDE, PRESERVATIVE FREE 10 ML: 5 INJECTION INTRAVENOUS at 21:29

## 2021-01-14 RX ADMIN — POTASSIUM CHLORIDE 20 MEQ: 20 TABLET, EXTENDED RELEASE ORAL at 15:16

## 2021-01-14 RX ADMIN — CARVEDILOL 25 MG: 25 TABLET, FILM COATED ORAL at 17:40

## 2021-01-14 RX ADMIN — FUROSEMIDE 20 MG: 10 INJECTION, SOLUTION INTRAVENOUS at 08:38

## 2021-01-14 ASSESSMENT — ENCOUNTER SYMPTOMS
SHORTNESS OF BREATH: 0
VOMITING: 0
CHEST TIGHTNESS: 0
DIARRHEA: 0
APNEA: 0
COLOR CHANGE: 0
NAUSEA: 0
ABDOMINAL DISTENTION: 0

## 2021-01-14 ASSESSMENT — PAIN SCALES - GENERAL: PAINLEVEL_OUTOF10: 0

## 2021-01-14 NOTE — CARE COORDINATION
RECEIVED CALL FROM PTS SON OSVALDO. ANSWERED ALL QUESTIONS AND CONCERNS. CONFIRMED DC PLAN WILL BE HOME WITH [facility], POSSIBLY TOMORROW. ALEXANDER  Ne [facility] NOTIFIED.

## 2021-01-14 NOTE — PROGRESS NOTES
[facility] OCCUPATIONAL THERAPY MED SURG TREATMENT NOTE     Date: 2021  Patient Name: Nick Vera MRN: [id number]  Account: [de-identified]   : 1932  (80 y.o.)  Room: [place] [address]    Chart Review:  Diagnosis:  The primary encounter diagnosis was Acute congestive heart failure, unspecified heart failure type ([facility] Diagnoses of Acute renal injury ([facility] and Hematoma were also pertinent to this visit. Restrictions:              Subjective:  Patient states:  \"oh, can you discharge me\"  Pain:  Start of tx:  Pre Treatment Pain Screening  Pain at present: 0  Scale Used: Numeric Score  Intervention List: Patient able to continue with treatment    End of tx:  Pain Assessment  Patient Currently in Pain: No  Pain Assessment: 0-10  Pain Level: 0    Objective: Pt was pleasant and cooperative with date     ADL:  ADL  Feeding: Modified independent   Grooming: Modified independent   UE Bathing: Modified independent   LE Bathing: Supervision  UE Dressing: Supervision  LE Dressing: Supervision  Toileting: Supervision  Additional Comments: with use of FWW and standing hand washing, act josselyn >7 mins with signs of fatigue this date  Toilet Transfers  Toilet - Technique: Stand step, Ambulating  Equipment Used: Grab bars  Toilet Transfer: Modified independent  Toilet Transfers Comments: Increased time to complete.  Pt is slow but steady          ANA hose donned: Yes  If no, why:     Therapy key for assistance levels    Independent = Pt. is able to perform task with no assistance but may require a device   Stand by assistance = Pt. does not perform task at an independent level but does not need physical assistance, requires verbal cues  Minimal, Moderate, Maximal Assistance = Pt. requires physical assistance (25%, 50%, 75% assist from helper) for task but is able to actively participate in task   Dependent = Pt. requires total assistance with task and is not able to actively participate with task completion Functional Balance:  Balance  Sitting Balance: Independent  Standing Balance: Modified independent    Functional Mobility  Functional - Mobility Device: No device  Activity: To/from bathroom  Assist Level: Supervision  Functional Mobility Comments: Moves slowly but steady, no LOB    Cognition:  Cognition  Overall Cognitive Status: WFL    Bed Mobility  Bed mobility  Rolling to Left: Modified independent  Rolling to Right: Modified independent  Supine to Sit: Modified independent  Sit to Supine: Modified independent  Scooting: Modified independent  Comment: Pt transferred to bed with use of FWW, pt was able to bring LEs up into bed and position self with Poly    UE Exercises:          Treatment consisted of:  ADL Training  Transfer Training    Assessment/Discharge Disposition:     Performance deficits / Impairments: Decreased functional mobility , Decreased ADL status, Decreased endurance, Decreased high-level IADLs, Decreased strength, Decreased safe awareness  Prognosis: Good  Discharge Recommendations: Continue to assess pending progress, Home with assist PRN  History: 4 comorbidities  Exam: 6 performance deficits  Assistance / Modification: SBA/SUP      6-Click  How much help for putting on and taking off regular lower body clothing?: None  How much help for Bathing?: A Little  How much help for Toileting?: A Little  How much help for putting on and taking off regular upper body clothing?: A Little  How much help for taking care of personal grooming?: None  How much help for eating meals?: None  AM-PAC Inpatient Daily Activity Raw Score: 21  AM-PAC Inpatient ADL T-Scale Score : 44.27  ADL Inpatient CMS 0-100% Score: 32.79    Plan:  Continue OT per POC    Goals/Plan:    Improve Functional Transfers  Improve ADL Gilliam    Minutes:    OT Individual Minutes  Time In: 1040  Time Out: 1103  Minutes: 23    ADL trainin minutes    Electronically signed by:    Eufemia Diaz 1/14/2021, 11:12 AM Occupational Therapy

## 2021-01-14 NOTE — PROGRESS NOTES
Progress Note  Patient: Gray Kulkarni. Unit/Bed: J139/N252-96  YOB: 1932  MRN: [id number]  Acct: [de-identified]   Admitting Diagnosis: Acute diastolic heart failure ([facility] [address] [I50.31]  Date:  1/9/2021  Hospital Day: 5    Chief Complaint:  Congestive heart failure    Subjective  Advanced cardiomyopathy. .  Responded to extra diuretics. Also responded to Venofer injection. Hemoglobin is 9. BUN is elevated with creatinine stable 1.1 potassium is 4.3. Will give extra 40 mg Lasix IV. Also Zaroxolyn 2.5 extra. And will give 1 more dose of venofer    Review of Systems:   Review of Systems   Constitutional: Negative for appetite change, diaphoresis and fatigue. Respiratory: Negative for apnea, chest tightness and shortness of breath. Cardiovascular: Negative for chest pain, palpitations and leg swelling. Gastrointestinal: Negative for abdominal distention, diarrhea, nausea and vomiting. Skin: Negative for color change, pallor, rash and wound. Neurological: Negative for dizziness, syncope, weakness, light-headedness, numbness and headaches. Psychiatric/Behavioral: Negative for agitation, behavioral problems and confusion. The patient is not nervous/anxious and is not hyperactive. All other systems reviewed and are negative. Physical Examination:    /86   Pulse 80   Temp 97.6 °F (36.4 °C) (Oral)   Resp 18   Ht 5' 3\" (1.6 m)   Wt 165 lb 6.4 oz (75 kg)   SpO2 95%   BMI 29.30 kg/m²    Physical Exam  Constitutional:       Appearance: He is well-developed. HENT:      Head: Atraumatic. Eyes:      Conjunctiva/sclera: Conjunctivae normal.      Pupils: Pupils are equal, round, and reactive to light. Neck:      Thyroid: No thyromegaly. Vascular: No JVD. Trachea: No tracheal deviation. Cardiovascular:      Rate and Rhythm: Normal rate and regular rhythm. Heart sounds: No murmur. No friction rub. No gallop. Pulmonary:      Effort: No respiratory distress. Breath sounds: No wheezing or rales. Chest:      Chest wall: No tenderness. Abdominal:      General: There is no distension. Palpations: Abdomen is soft. There is no mass. Tenderness: There is no abdominal tenderness. There is no guarding or rebound. Musculoskeletal: Normal range of motion. Lymphadenopathy:      Cervical: No cervical adenopathy. Skin:     General: Skin is warm. Neurological:      Mental Status: He is alert and oriented to person, place, and time.          LABS:  CBC:   Lab Results   Component Value Date    WBC 10.6 01/12/2021    RBC 3.94 01/12/2021    HGB 9.1 01/12/2021    HCT 29.3 01/12/2021    MCV 74.3 01/12/2021    MCH 23.1 01/12/2021    MCHC 31.1 01/12/2021    RDW 16.7 01/12/2021     01/12/2021     CBC with Differential:   Lab Results   Component Value Date    WBC 10.6 01/12/2021    RBC 3.94 01/12/2021    HGB 9.1 01/12/2021    HCT 29.3 01/12/2021     01/12/2021    MCV 74.3 01/12/2021    MCH 23.1 01/12/2021    MCHC 31.1 01/12/2021    RDW 16.7 01/12/2021    LYMPHOPCT 11.1 01/12/2021    MONOPCT 4.0 01/12/2021    BASOPCT 0.4 01/12/2021    MONOSABS 0.4 01/12/2021    LYMPHSABS 1.2 01/12/2021    EOSABS 0.0 01/12/2021    BASOSABS 0.0 01/12/2021     CMP:    Lab Results   Component Value Date     01/14/2021    K 3.4 01/14/2021    K 4.3 04/15/2019    CL 93 01/14/2021    CO2 24 01/14/2021    BUN 58 01/14/2021    CREATININE 1.18 01/14/2021    GFRAA >60.0 01/14/2021    LABGLOM 58.2 01/14/2021    GLUCOSE 100 01/14/2021    PROT 6.5 01/09/2021    LABALBU 4.1 01/09/2021    CALCIUM 8.9 01/14/2021    BILITOT 1.3 01/09/2021    ALKPHOS 59 01/09/2021    AST 34 01/09/2021    ALT 18 01/09/2021     BMP:    Lab Results   Component Value Date     01/14/2021    K 3.4 01/14/2021    K 4.3 04/15/2019    CL 93 01/14/2021    CO2 24 01/14/2021    BUN 58 01/14/2021    LABALBU 4.1 01/09/2021    CREATININE 1.18 01/14/2021    CALCIUM 8.9 01/14/2021    GFRAA >60.0 01/14/2021 LABGLOM 58.2 01/14/2021    GLUCOSE 100 01/14/2021     Magnesium:    Lab Results   Component Value Date    MG 1.9 01/14/2021     Troponin:    Lab Results   Component Value Date    TROPONINI 0.033 01/09/2021       Radiology:  No results found. EKG: Assessment:    Active Hospital Problems    Diagnosis Date Noted    Acute systolic CHF (congestive heart failure) ([facility]) [I50.21] 01/13/2021    AICD (automatic cardioverter/defibrillator) present [Z95.810]     Acute renal failure superimposed on stage 3a chronic kidney disease ([facility] [address] [N17.9, N18.31]     Acute diastolic heart failure ([facility] [address] [I50.31] 01/09/2021           Plan:  1. Hemoglobin is stabilized at 9. We will give 1 more dose of Venofer  2. Creatinine is stable. 3. Patient was given extra diuretic yesterday with improvement of symptoms. Will probably need to be on Zaroxolyn once or twice a week as outpatient.   Would like him to be on Aldactone because of renal insufficiency it may be an issue  Electronically signed by Phyllis Locke MD on 1/14/2021 at 2:27 PM

## 2021-01-14 NOTE — PROGRESS NOTES
Physician Progress Note    1/14/2021   2:44 PM    Name:  Jose A Tanner. MRN:    [id number]      Day: 5     Admit Date: 1/9/2021  9:49 AM  PCP: Matilde Ching MD    Code Status:  Full Code    Subjective:     No further dyspnea. He is getting \"antsy\" to get out of the hospital. Diuresing well.      Current Facility-Administered Medications   Medication Dose Route Frequency Provider Last Rate Last Admin    furosemide (LASIX) injection 40 mg  40 mg Intravenous Once [place]        metOLazone (ZAROXOLYN) tablet 2.5 mg  2.5 mg Oral Once [place]        potassium chloride (KLOR-CON M) extended release tablet 20 mEq  20 mEq Oral Daily with breakfast [place]        iron sucrose (VENOFER) injection 100 mg  100 mg Intravenous Once [place]        apixaban (ELIQUIS) tablet 5 mg  5 mg Oral BID Ann Hardy MD   5 mg at 01/14/21 0837    melatonin tablet 3 mg  3 mg Oral Nightly Madalynn Moritz, DO   3 mg at 01/13/21 2131    furosemide (LASIX) injection 20 mg  20 mg Intravenous Daily Mouna Lama MD   20 mg at 01/14/21 0838    carvedilol (COREG) tablet 25 mg  25 mg Oral BID WC Mouna Lama MD   25 mg at 01/14/21 0837    atorvastatin (LIPITOR) tablet 40 mg  40 mg Oral Nightly Ann Hardy MD   40 mg at 01/13/21 2131    sacubitril-valsartan (ENTRESTO) 24-26 MG per tablet 1 tablet  1 tablet Oral BID Ann Hardy MD   1 tablet at 01/14/21 0837    sodium chloride flush 0.9 % injection 10 mL  10 mL Intravenous 2 times per day Ann Hardy MD   10 mL at 01/14/21 0838    sodium chloride flush 0.9 % injection 10 mL  10 mL Intravenous PRN Ann Hardy MD        promethazine (PHENERGAN) tablet 12.5 mg  12.5 mg Oral Q6H PRN Ann Hardy MD        Or    on[facility]ansetron [facility]) injection 4 mg  4 mg Intravenous Q6H PRN Ann Hardy MD        polyethylene glycol (GLYCOLAX) packet 17 g  17 g Oral Daily PRN Ann Hardy, MD  acetaminophen (TYLENOL) tablet 650 mg  650 mg Oral Q6H PRN Greg Bourne MD   650 mg at 21 0601    Or    acetaminophen (TYLENOL) suppository 650 mg  650 mg Rectal Q6H PRN Greg Bourne MD           Physical Examination:      Vitals:  /86   Pulse 80   Temp 97.6 °F (36.4 °C) (Oral)   Resp 18   Ht 5' 3\" (1.6 m)   Wt 165 lb 6.4 oz (75 kg)   SpO2 95%   BMI 29.30 kg/m²   Temp (24hrs), Av.4 °F (36.3 °C), Min:97.3 °F (36.3 °C), Max:97.6 °F (36.4 °C)      General appearance: Frail, elderly. NAD. Smiling, cooperative, oriented x3. Lungs: clear to auscultation bilaterally, normal effort  Heart: Irregularly irregular, no murmur  Abdomen: soft, nontender, nondistended, bowel sounds present, no masses  Extremities: 1+ pitting edema bilaterally  Skin: no gross lesions, rashes    Data:     Labs:  Recent Labs     21  0541   WBC 10.6   HGB 9.1*        Recent Labs     21  0548 21  0611   * 130*   K 4.1 3.4   CL 96 93*   CO2 22 24   BUN 67* 58*   CREATININE 1.38* 1.18   GLUCOSE 151* 100*     No results for input(s): AST, ALT, ALB, BILITOT, ALKPHOS in the last 72 hours. Echo:   Conclusions   Summary   Moderately severe (3+) mitral regurgitation is present. S/P TAVR   Peak/mean gradient 17/7 mm Hg YASMINE 0.9 cm2   Normal tricuspid valve structure and function. There is mild to moderate ( 2 +) tricuspid regurgitation with estimated   RVSP of 58 mm Hg. Moderately dilated left atrium. Left ventricular ejection fraction is visually estimated at 15%. Abnormal (paradoxical) motion consistent with RV pacemaker. Restrictive filling pattern. Normal right ventricle structure and function. Pacer wire visualized in right ventricle. Assessment and Plan:        42-year-old male with history of CAD, ischemic cardiomyopathy EF 15% (35% in Oct 2020) s/p AICD/CRT, chronic atrial fibrillation on Eliquis, TIA, severe AS s/p TAVR presented on  with worsening dyspnea.

## 2021-01-15 LAB
ANION GAP SERPL CALCULATED.3IONS-SCNC: 16 MEQ/L (ref 9–15)
BUN BLDV-MCNC: 56 MG/DL (ref 8–23)
CALCIUM SERPL-MCNC: 9.5 MG/DL (ref 8.5–9.9)
CHLORIDE BLD-SCNC: 89 MEQ/L (ref 95–107)
CO2: 27 MEQ/L (ref 20–31)
CREAT SERPL-MCNC: 1.06 MG/DL (ref 0.7–1.2)
GFR AFRICAN AMERICAN: >60
GFR NON-AFRICAN AMERICAN: >60
GLUCOSE BLD-MCNC: 104 MG/DL (ref 70–99)
MAGNESIUM: 1.8 MG/DL (ref 1.7–2.4)
POTASSIUM SERPL-SCNC: 3.6 MEQ/L (ref 3.4–4.9)
SODIUM BLD-SCNC: 132 MEQ/L (ref 135–144)

## 2021-01-15 PROCEDURE — 2060000000 HC ICU INTERMEDIATE R&B

## 2021-01-15 PROCEDURE — 6370000000 HC RX 637 (ALT 250 FOR IP): Performed by: PHYSICIAN ASSISTANT

## 2021-01-15 PROCEDURE — 6370000000 HC RX 637 (ALT 250 FOR IP): Performed by: INTERNAL MEDICINE

## 2021-01-15 PROCEDURE — 6360000002 HC RX W HCPCS: Performed by: INTERNAL MEDICINE

## 2021-01-15 PROCEDURE — APPNB30 APP NON BILLABLE TIME 0-30 MINS: Performed by: PHYSICIAN ASSISTANT

## 2021-01-15 PROCEDURE — 6360000002 HC RX W HCPCS: Performed by: PHYSICIAN ASSISTANT

## 2021-01-15 PROCEDURE — 36415 COLL VENOUS BLD VENIPUNCTURE: CPT

## 2021-01-15 PROCEDURE — 99233 SBSQ HOSP IP/OBS HIGH 50: CPT | Performed by: INTERNAL MEDICINE

## 2021-01-15 PROCEDURE — 83735 ASSAY OF MAGNESIUM: CPT

## 2021-01-15 PROCEDURE — 80048 BASIC METABOLIC PNL TOTAL CA: CPT

## 2021-01-15 PROCEDURE — 2580000003 HC RX 258: Performed by: INTERNAL MEDICINE

## 2021-01-15 RX ORDER — FUROSEMIDE 10 MG/ML
40 INJECTION INTRAMUSCULAR; INTRAVENOUS ONCE
Status: COMPLETED | OUTPATIENT
Start: 2021-01-15 | End: 2021-01-15

## 2021-01-15 RX ORDER — METOLAZONE 5 MG/1
2.5 TABLET ORAL DAILY
Status: DISCONTINUED | OUTPATIENT
Start: 2021-01-15 | End: 2021-01-16 | Stop reason: HOSPADM

## 2021-01-15 RX ORDER — POTASSIUM CHLORIDE 20 MEQ/1
20 TABLET, EXTENDED RELEASE ORAL
Qty: 60 TABLET | Refills: 3 | Status: SHIPPED | OUTPATIENT
Start: 2021-01-16

## 2021-01-15 RX ORDER — METOLAZONE 2.5 MG/1
2.5 TABLET ORAL
Qty: 30 TABLET | Refills: 3 | Status: SHIPPED | OUTPATIENT
Start: 2021-01-18 | End: 2021-01-16 | Stop reason: SDUPTHER

## 2021-01-15 RX ORDER — FUROSEMIDE 40 MG/1
40 TABLET ORAL DAILY
Qty: 30 TABLET | Refills: 3 | Status: SHIPPED | OUTPATIENT
Start: 2021-01-15

## 2021-01-15 RX ORDER — CARVEDILOL 25 MG/1
25 TABLET ORAL 2 TIMES DAILY WITH MEALS
Qty: 60 TABLET | Refills: 3 | Status: SHIPPED | OUTPATIENT
Start: 2021-01-15

## 2021-01-15 RX ORDER — LORAZEPAM 0.5 MG/1
0.25 TABLET ORAL NIGHTLY
Status: COMPLETED | OUTPATIENT
Start: 2021-01-15 | End: 2021-01-15

## 2021-01-15 RX ORDER — CARVEDILOL 25 MG/1
25 TABLET ORAL 2 TIMES DAILY WITH MEALS
Qty: 60 TABLET | Refills: 3 | Status: SHIPPED | OUTPATIENT
Start: 2021-01-15 | End: 2021-01-15

## 2021-01-15 RX ORDER — LANOLIN ALCOHOL/MO/W.PET/CERES
6 CREAM (GRAM) TOPICAL NIGHTLY
Status: DISCONTINUED | OUTPATIENT
Start: 2021-01-15 | End: 2021-01-16 | Stop reason: HOSPADM

## 2021-01-15 RX ORDER — LANOLIN ALCOHOL/MO/W.PET/CERES
400 CREAM (GRAM) TOPICAL ONCE
Status: COMPLETED | OUTPATIENT
Start: 2021-01-15 | End: 2021-01-15

## 2021-01-15 RX ORDER — FUROSEMIDE 40 MG/1
40 TABLET ORAL DAILY
Qty: 30 TABLET | Refills: 3 | Status: SHIPPED | OUTPATIENT
Start: 2021-01-15 | End: 2021-01-15 | Stop reason: SDUPTHER

## 2021-01-15 RX ORDER — POTASSIUM CHLORIDE 20 MEQ/1
20 TABLET, EXTENDED RELEASE ORAL ONCE
Status: COMPLETED | OUTPATIENT
Start: 2021-01-15 | End: 2021-01-15

## 2021-01-15 RX ADMIN — CARVEDILOL 25 MG: 25 TABLET, FILM COATED ORAL at 09:41

## 2021-01-15 RX ADMIN — ATORVASTATIN CALCIUM 40 MG: 40 TABLET, FILM COATED ORAL at 20:17

## 2021-01-15 RX ADMIN — SACUBITRIL AND VALSARTAN 1 TABLET: 24; 26 TABLET, FILM COATED ORAL at 20:17

## 2021-01-15 RX ADMIN — POTASSIUM CHLORIDE 20 MEQ: 20 TABLET, EXTENDED RELEASE ORAL at 09:42

## 2021-01-15 RX ADMIN — Medication 6 MG: at 20:17

## 2021-01-15 RX ADMIN — POTASSIUM CHLORIDE 20 MEQ: 20 TABLET, EXTENDED RELEASE ORAL at 09:43

## 2021-01-15 RX ADMIN — METOLAZONE 2.5 MG: 5 TABLET ORAL at 14:18

## 2021-01-15 RX ADMIN — SODIUM CHLORIDE, PRESERVATIVE FREE 10 ML: 5 INJECTION INTRAVENOUS at 09:42

## 2021-01-15 RX ADMIN — FUROSEMIDE 40 MG: 10 INJECTION, SOLUTION INTRAMUSCULAR; INTRAVENOUS at 14:19

## 2021-01-15 RX ADMIN — SODIUM CHLORIDE, PRESERVATIVE FREE 10 ML: 5 INJECTION INTRAVENOUS at 20:18

## 2021-01-15 RX ADMIN — SACUBITRIL AND VALSARTAN 1 TABLET: 24; 26 TABLET, FILM COATED ORAL at 09:42

## 2021-01-15 RX ADMIN — APIXABAN 5 MG: 5 TABLET, FILM COATED ORAL at 20:17

## 2021-01-15 RX ADMIN — CARVEDILOL 25 MG: 25 TABLET, FILM COATED ORAL at 17:42

## 2021-01-15 RX ADMIN — FUROSEMIDE 20 MG: 10 INJECTION, SOLUTION INTRAVENOUS at 09:42

## 2021-01-15 RX ADMIN — ACETAMINOPHEN 650 MG: 325 TABLET, FILM COATED ORAL at 14:18

## 2021-01-15 RX ADMIN — APIXABAN 5 MG: 5 TABLET, FILM COATED ORAL at 09:42

## 2021-01-15 RX ADMIN — Medication 400 MG: at 09:45

## 2021-01-15 RX ADMIN — LORAZEPAM 0.25 MG: 0.5 TABLET ORAL at 20:17

## 2021-01-15 ASSESSMENT — ENCOUNTER SYMPTOMS
NAUSEA: 0
CHEST TIGHTNESS: 0
COLOR CHANGE: 0
VOMITING: 0
SHORTNESS OF BREATH: 1
ABDOMINAL PAIN: 0

## 2021-01-15 ASSESSMENT — PAIN SCALES - GENERAL: PAINLEVEL_OUTOF10: 8

## 2021-01-15 NOTE — PROGRESS NOTES
Physician Progress Note    1/15/2021   3:49 PM    Name:  Stacie Almonte. MRN:    [id number]      Day: 6     Admit Date: 1/9/2021  9:49 AM  PCP: Zane Hansen MD    Code Status:  Full Code    Subjective:     Not sleeping very well and some dry cough but otherwise doing ok.      Current Facility-Administered Medications   Medication Dose Route Frequency Provider Last Rate Last Admin    melatonin tablet 6 mg  6 mg Oral Nightly Mauricio Key DO        metOLazone (ZAROXOLYN) tablet 2.5 mg  2.5 mg Oral Daily Tereas Siddiqui   2.5 mg at 01/15/21 1418    potassium chloride (KLOR-CON M) extended release tablet 20 mEq  20 mEq Oral Daily with breakfast Teresa Siddiqui   20 mEq at 01/15/21 2366    apixaban (ELIQUIS) tablet 5 mg  5 mg Oral BID Alyas Guajardo MD   5 mg at 01/15/21 4337    furosemide (LASIX) injection 20 mg  20 mg Intravenous Daily Annette Spivey MD   20 mg at 01/15/21 0942    carvedilol (COREG) tablet 25 mg  25 mg Oral BID WC Annette Spivey MD   25 mg at 01/15/21 0941    atorvastatin (LIPITOR) tablet 40 mg  40 mg Oral Nightly Alysa Guajardo MD   40 mg at 01/14/21 2129    sacubitril-valsartan (ENTRESTO) 24-26 MG per tablet 1 tablet  1 tablet Oral BID Alysa Guajardo MD   1 tablet at 01/15/21 8932    sodium chloride flush 0.9 % injection 10 mL  10 mL Intravenous 2 times per day Alysa Guajardo MD   10 mL at 01/15/21 0942    sodium chloride flush 0.9 % injection 10 mL  10 mL Intravenous PRN Alysa Guajardo MD        promethazine (PHENERGAN) tablet 12.5 mg  12.5 mg Oral Q6H PRN Alysa Guajardo MD        Or    ondansetron [facility]) injection 4 mg  4 mg Intravenous Q6H PRN Alysa Guajardo MD        polyethylene glycol (GLYCOLAX) packet 17 g  17 g Oral Daily PRN Alysa Guajardo MD        acetaminophen (TYLENOL) tablet 650 mg  650 mg Oral Q6H PRN Alysa Guajardo MD   650 mg at 01/15/21 1418    Or  acetaminophen (TYLENOL) suppository 650 mg  650 mg Rectal Q6H PRN Matthew Quiroga MD           Physical Examination:      Vitals:  /73   Pulse 80   Temp 97.3 °F (36.3 °C) (Oral)   Resp 18   Ht 5' 3\" (1.6 m)   Wt 165 lb 6.4 oz (75 kg)   SpO2 100%   BMI 29.30 kg/m²   Temp (24hrs), Av.7 °F (36.5 °C), Min:97.3 °F (36.3 °C), Max:98 °F (36.7 °C)      General appearance: Frail, elderly. NAD. Smiling, cooperative, oriented x3. Lungs: clear to auscultation bilaterally, normal effort  Heart: Irregularly irregular, no murmur  Abdomen: soft, nontender, nondistended, bowel sounds present, no masses  Extremities: trace pitting edema bilaterally  Skin: no gross lesions, rashes    Data:     Labs:  No results for input(s): WBC, HGB, PLT in the last 72 hours. Recent Labs     21  0611 01/15/21  0608   * 132*   K 3.4 3.6   CL 93* 89*   CO2 24 27   BUN 58* 56*   CREATININE 1.18 1.06   GLUCOSE 100* 104*     No results for input(s): AST, ALT, ALB, BILITOT, ALKPHOS in the last 72 hours. Echo:   Conclusions   Summary   Moderately severe (3+) mitral regurgitation is present. S/P TAVR   Peak/mean gradient 17/7 mm Hg YASMINE 0.9 cm2   Normal tricuspid valve structure and function. There is mild to moderate ( 2 +) tricuspid regurgitation with estimated   RVSP of 58 mm Hg. Moderately dilated left atrium. Left ventricular ejection fraction is visually estimated at 15%. Abnormal (paradoxical) motion consistent with RV pacemaker. Restrictive filling pattern. Normal right ventricle structure and function. Pacer wire visualized in right ventricle. Assessment and Plan:        80-year-old male with history of CAD, ischemic cardiomyopathy EF 15% (35% in Oct 2020) s/p AICD/CRT, chronic atrial fibrillation on Eliquis, TIA, severe AS s/p TAVR presented on  with worsening dyspnea.

## 2021-01-15 NOTE — CARE COORDINATION
PER CARDIOLOGY PLAN FOR ONE MORE DAY OF IV LASIX AND POSSIBLE DC TOMORROW. WILL UPDATE [address]

## 2021-01-15 NOTE — PROGRESS NOTES
Progress Note  Patient: Gray Kulkarni. Unit/Bed: N316/B171-03  YOB: 1932  MRN: [id number]  Acct: [de-identified]   Admitting Diagnosis: [facility] [I50.31]  Date:  1/9/2021  Hospital Day: 6    Chief Complaint:  Shortness of breath    Subjective    1/15/21: Resting comfortably in bed in no acute distress. He is to diurese well although I's and O's are inaccurate. Currently on Lasix 20 mg IV daily but has received an extra dose of Lasix 40 mg IV on Wednesday and Thursday in addition to a dose of metolazone 2.5 mg p.o. x1 each day. Lower extremity edema improving. Patient denies any shortness of breath or orthopnea. Renal function improving following more aggressive diuresis with creatinine of 1.06, BUN of 56 and GFR greater than 60 today. Sodium low but slightly improved to 132 today. Patient is a longstanding patient of [facility], Dr. Felicitas Hammond and wishing to continue to follow-up with him upon discharge for ongoing management of CHF. He is hemodynamically stable. On telemetry A. fib with V pacing with heart rate in the 70s. 1/14/21: Advanced cardiomyopathy. .  Responded to extra diuretics. Also responded to Venofer injection. Hemoglobin is 9. BUN is elevated with creatinine stable 1.1 potassium is 4.3. Will give extra 40 mg Lasix IV. Also Zaroxolyn 2.5 extra. And will give 1 more dose of venofer    1/13/21: Resting comfortably in bed in no acute distress. Complains of orthopnea this morning and felt better when sitting up. Breathing comfortably on room air at this time. Has significant bilateral lower extremity edema with pitting into his thighs. States that his lower extremity edema has worsened over the past month and he has been increasingly fatigued and tired and short of breath with activity. Currently on IV diuresis with Lasix 20 mg IV daily with approximately 2300 mL negative fluid balance since admission. Patient will likely benefit from more aggressive diuresis. Will give additional Lasix 40 mg IV x1 this evening. Patient follows with cardiology through [facility], Dr. Kristi Schulte. Had echocardiogram at [facility] in October 2020 which showed EF of 36% at that time. Patient had cardiac catheterization prior to TAVR in 2017 which revealed mild CAD. Has not had any cardiac catheterization since that time. He is hemodynamically stable. On telemetry A. fib with V pacing and heart rate in the 80s. He is on oral anticoagulation with Eliquis 5 mg p.o. twice daily. 1/12/21: Shortness of breath is still present. Moderate. Urine output -1.6 L overnight. Hemoglobin is 9.1. Will give venofer    1/11/21: Known to have ischemic cardiomyopathy status post AICD status post TAVR moderate renal insufficiency. Minimal exercise tolerance some ankle edema. No ICD shocks    1/10/21: Cardiac history of a TAVR in the past.  Ventricular tachycardia status post AICD all the cardiac work-up was done at [facility]. Still drives. Just minimal exertion he gets short of breath no ICD shocks. Review of Systems:   Review of Systems   Constitutional: Positive for fatigue. Negative for activity change and fever. HENT: Negative for congestion. Respiratory: Positive for shortness of breath. Negative for chest tightness. Cardiovascular: Positive for leg swelling. Negative for chest pain and palpitations. +orthopnea     Gastrointestinal: Negative for abdominal pain, nausea and vomiting. Genitourinary: Negative for difficulty urinating. Musculoskeletal: Negative for arthralgias. Skin: Negative for color change, pallor and rash. Neurological: Negative for dizziness and syncope. Psychiatric/Behavioral: Negative for agitation.          Physical Examination: /84   Pulse 81   Temp 98 °F (36.7 °C) (Oral)   Resp 18   Ht 5' 3\" (1.6 m)   Wt 165 lb 6.4 oz (75 kg)   SpO2 97%   BMI 29.30 kg/m²    Physical Exam  Constitutional:       General: He is not in acute distress. Appearance: Normal appearance. HENT:      Head: Normocephalic and atraumatic. Neck:      Musculoskeletal: Normal range of motion and neck supple. Cardiovascular:      Rate and Rhythm: Normal rate. Rhythm irregularly irregular. Pulmonary:      Effort: Pulmonary effort is normal. No respiratory distress. Breath sounds: No wheezing, rhonchi or rales. Abdominal:      Palpations: Abdomen is soft. Tenderness: There is no abdominal tenderness. Musculoskeletal:      Right lower leg: Edema (2+ right lower leg edema; 2-3+ edema right thigh) present. Left lower leg: Edema (2+ left lower leg edema; 2-3+ left thigh edema) present. Comments: Compressing stockings in place bilateral lower legs   Skin:     General: Skin is warm and dry. Neurological:      General: No focal deficit present. Mental Status: He is alert and oriented to person, place, and time. Cranial Nerves: No cranial nerve deficit.    Psychiatric:         Mood and Affect: Mood normal.         Behavior: Behavior normal.         LABS:  CBC:   Lab Results   Component Value Date    WBC 10.6 01/12/2021    RBC 3.94 01/12/2021    HGB 9.1 01/12/2021    HCT 29.3 01/12/2021    MCV 74.3 01/12/2021    MCH 23.1 01/12/2021    MCHC 31.1 01/12/2021    RDW 16.7 01/12/2021     01/12/2021     CBC with Differential:   Lab Results   Component Value Date    WBC 10.6 01/12/2021    RBC 3.94 01/12/2021    HGB 9.1 01/12/2021    HCT 29.3 01/12/2021     01/12/2021    MCV 74.3 01/12/2021    MCH 23.1 01/12/2021    MCHC 31.1 01/12/2021    RDW 16.7 01/12/2021    LYMPHOPCT 11.1 01/12/2021    MONOPCT 4.0 01/12/2021    BASOPCT 0.4 01/12/2021    MONOSABS 0.4 01/12/2021    LYMPHSABS 1.2 01/12/2021    EOSABS 0.0 01/12/2021 BASOSABS 0.0 01/12/2021     CMP:    Lab Results   Component Value Date     01/15/2021    K 3.6 01/15/2021    K 4.3 04/15/2019    CL 89 01/15/2021    CO2 27 01/15/2021    BUN 56 01/15/2021    CREATININE 1.06 01/15/2021    GFRAA >60.0 01/15/2021    LABGLOM >60.0 01/15/2021    GLUCOSE 104 01/15/2021    PROT 6.5 01/09/2021    LABALBU 4.1 01/09/2021    CALCIUM 9.5 01/15/2021    BILITOT 1.3 01/09/2021    ALKPHOS 59 01/09/2021    AST 34 01/09/2021    ALT 18 01/09/2021     BMP:    Lab Results   Component Value Date     01/15/2021    K 3.6 01/15/2021    K 4.3 04/15/2019    CL 89 01/15/2021    CO2 27 01/15/2021    BUN 56 01/15/2021    LABALBU 4.1 01/09/2021    CREATININE 1.06 01/15/2021    CALCIUM 9.5 01/15/2021    GFRAA >60.0 01/15/2021    LABGLOM >60.0 01/15/2021    GLUCOSE 104 01/15/2021     Magnesium:    Lab Results   Component Value Date    MG 1.8 01/15/2021     Troponin:    Lab Results   Component Value Date    TROPONINI 0.033 01/09/2021       Radiology:  No results found. Echocardiogram 1/9/21:  Conclusions   Summary   Moderately severe (3+) mitral regurgitation is present. S/P TAVR   Peak/mean gradient 17/7 mm Hg YASMINE 0.9 cm2   Normal tricuspid valve structure and function. There is mild to moderate ( 2 +) tricuspid regurgitation with estimated   RVSP of 58 mm Hg. Moderately dilated left atrium. Left ventricular ejection fraction is visually estimated at 15%. Abnormal (paradoxical) motion consistent with RV pacemaker. Restrictive filling pattern. Normal right ventricle structure and function. Pacer wire visualized in right ventricle.    Signature   ----------------------------------------------------------------   Electronically signed by Colleen Khan(Interpreting physician)   on 01/09/2021 05:51 PM    [facility] 6/29/17 at [facility]:  DIAGNOSTIC FINDINGS    Coronary Anatomy: Right Dominant    Injection Site(s): Left Main Coronary Artery and Right Coronary Artery LMT: The LMT has mild diffuse disease. LAD: The LAD has mild diffuse disease. LCX: The Circumflex has mild diffuse disease. RAMUS: The Ramus is Absent. RCA: The RCA has mild diffuse disease. Additional Comment: dominant vessel. IMPRESSION PLAN    Impression:- mild diffuse disease    Recommended Treatment: Medical Therapy and Valve repair / replacement. Plan: - no coronary intervention required at the time of TAVR        EKG 1/9/21: A-fib 88, LBBB, T wave inversion in leads I, aVL, QTc 517ms    Telemetry 1/13/21: A-fib with V-pacing 80s  Telemetry 1/15/21: ?A-fib with V-pacing 70s        Assessment:    Active Hospital Problems    Diagnosis Date Noted    Acute systolic CHF (congestive heart failure) ([facility]) [I50.21] 01/13/2021    AICD (automatic cardioverter/defibrillator) present [Z95.810]     Acute renal failure superimposed on stage 3a chronic kidney disease ([facility] [N17.9, N18.31]     Acute diastolic heart failure ([facility] [I50.31] 01/09/2021     1. Acute systolic CHF  2. Worsening cardiomyopathy with EF 15% per echo 1/9/21--prior echo at [facility] [place] 10/2020 with EF 36%  3. Moderately severe MR per echo 1/9/21  4. Hx TAVR  5. PHTN with RVSP 58mmHg  6. Chronic atrial fibrillation  7. Hx CVA  8. LBBB  9. Hx of BiV ICD implant 6/5/2018  10. Mild CAD per [address] [place] [address] 6/29/17 at [facility] [place] prior to TAVR       Plan:  1. Maximize medical therapyCoreg 25 mg p.o. twice daily, IV Lasix as tolerated currently with Lasix 20 mg IV daily--change to 40mg PO daily at discharge, Entresto 24/26 mg p.o. twice daily, Lipitor 40 mg tablet p.o. daily at bedtime, oral anticoagulation with Eliquis 5 mg p.o. twice daily, potassium chloride 20meq PO daily  2. Consider addition of metolazone 2.5mg PO 2-3 times weekly at discharge as patient has responded well to PRN doses as inpatient  3. Cardiac/less than 2 g sodium diet recommended  4.  Check daily weight and strict intake and output  5. 1500 mL daily fluid restriction 6. Monitor on telemetry for any tachycardia or bradycardia arrhythmias  7. Maintain potassium greater than 4, magnesium greater than 2  8. GI/DVT prophylaxis  9. Coronary evaluation per Dr. Orin Merida to consider ischemic evaluation in future given worsening LV function  10. Maintain outpatient follow-up with [facility] cardiology upon discharge. Patient wishes to continue to follow with [facility] cardiology, Dr. Maci Patterson upon discharge. He was offered follow-up with [facility] heart failure clinic which he is declining currently.   11. Further recommendations to follow        Electronically signed by ILAN Vallecillo on 1/15/2021 at 11:18 AM

## 2021-01-16 VITALS
BODY MASS INDEX: 29.3 KG/M2 | OXYGEN SATURATION: 94 % | DIASTOLIC BLOOD PRESSURE: 67 MMHG | RESPIRATION RATE: 19 BRPM | HEART RATE: 84 BPM | WEIGHT: 165.4 LBS | SYSTOLIC BLOOD PRESSURE: 98 MMHG | HEIGHT: 63 IN | TEMPERATURE: 97.3 F

## 2021-01-16 LAB
ANION GAP SERPL CALCULATED.3IONS-SCNC: 17 MEQ/L (ref 9–15)
BUN BLDV-MCNC: 58 MG/DL (ref 8–23)
CALCIUM SERPL-MCNC: 9.5 MG/DL (ref 8.5–9.9)
CHLORIDE BLD-SCNC: 89 MEQ/L (ref 95–107)
CO2: 27 MEQ/L (ref 20–31)
CREAT SERPL-MCNC: 1.04 MG/DL (ref 0.7–1.2)
GFR AFRICAN AMERICAN: >60
GFR NON-AFRICAN AMERICAN: >60
GLUCOSE BLD-MCNC: 125 MG/DL (ref 70–99)
MAGNESIUM: 1.7 MG/DL (ref 1.7–2.4)
POTASSIUM SERPL-SCNC: 4 MEQ/L (ref 3.4–4.9)
SODIUM BLD-SCNC: 133 MEQ/L (ref 135–144)

## 2021-01-16 PROCEDURE — 99232 SBSQ HOSP IP/OBS MODERATE 35: CPT | Performed by: INTERNAL MEDICINE

## 2021-01-16 PROCEDURE — 6370000000 HC RX 637 (ALT 250 FOR IP): Performed by: INTERNAL MEDICINE

## 2021-01-16 PROCEDURE — 6370000000 HC RX 637 (ALT 250 FOR IP): Performed by: PHYSICIAN ASSISTANT

## 2021-01-16 PROCEDURE — 83735 ASSAY OF MAGNESIUM: CPT

## 2021-01-16 PROCEDURE — 36415 COLL VENOUS BLD VENIPUNCTURE: CPT

## 2021-01-16 PROCEDURE — 80048 BASIC METABOLIC PNL TOTAL CA: CPT

## 2021-01-16 PROCEDURE — 6360000002 HC RX W HCPCS: Performed by: INTERNAL MEDICINE

## 2021-01-16 RX ORDER — FUROSEMIDE 40 MG/1
40 TABLET ORAL DAILY
Status: DISCONTINUED | OUTPATIENT
Start: 2021-01-17 | End: 2021-01-16 | Stop reason: HOSPADM

## 2021-01-16 RX ORDER — FUROSEMIDE 40 MG/1
40 TABLET ORAL DAILY
Qty: 60 TABLET | Refills: 3 | Status: SHIPPED | OUTPATIENT
Start: 2021-01-17

## 2021-01-16 RX ORDER — METOLAZONE 2.5 MG/1
2.5 TABLET ORAL
Qty: 10 TABLET | Refills: 1 | Status: SHIPPED | OUTPATIENT
Start: 2021-01-18

## 2021-01-16 RX ADMIN — POTASSIUM CHLORIDE 20 MEQ: 20 TABLET, EXTENDED RELEASE ORAL at 10:05

## 2021-01-16 RX ADMIN — CARVEDILOL 25 MG: 25 TABLET, FILM COATED ORAL at 10:05

## 2021-01-16 RX ADMIN — METOLAZONE 2.5 MG: 5 TABLET ORAL at 10:05

## 2021-01-16 RX ADMIN — SACUBITRIL AND VALSARTAN 1 TABLET: 24; 26 TABLET, FILM COATED ORAL at 10:05

## 2021-01-16 RX ADMIN — FUROSEMIDE 20 MG: 10 INJECTION, SOLUTION INTRAVENOUS at 10:05

## 2021-01-16 RX ADMIN — APIXABAN 5 MG: 5 TABLET, FILM COATED ORAL at 10:05

## 2021-01-16 ASSESSMENT — ENCOUNTER SYMPTOMS
VOMITING: 0
NAUSEA: 0
COLOR CHANGE: 0
ABDOMINAL PAIN: 0
CHEST TIGHTNESS: 0
SHORTNESS OF BREATH: 1

## 2021-01-16 NOTE — PROGRESS NOTES
Progress Note  Patient: Nick Edmond. Unit/Bed: F154/E624-44  YOB: 1932  MRN: [id number]  Acct: [de-identified]   Admitting Diagnosis: Acute diastolic heart failure ([facility] [address] [I50.31]  Date:  1/9/2021  Hospital Day: 7    Chief Complaint:  Shortness of breath    Subjective    1/16/2021: Patient is resting comfortably. States he had a good night sleep. He denies any chest pain or shortness of breath. He would like to go home. 1/15/21: Resting comfortably in bed in no acute distress. He is to diurese well although I's and O's are inaccurate. Currently on Lasix 20 mg IV daily but has received an extra dose of Lasix 40 mg IV on Wednesday and Thursday in addition to a dose of metolazone 2.5 mg p.o. x1 each day. Lower extremity edema improving. Patient denies any shortness of breath or orthopnea. Renal function improving following more aggressive diuresis with creatinine of 1.06, BUN of 56 and GFR greater than 60 today. Sodium low but slightly improved to 132 today. Patient is a longstanding patient of [facility], Dr. Mehul Lopez and wishing to continue to follow-up with him upon discharge for ongoing management of CHF. He is hemodynamically stable. On telemetry A. fib with V pacing with heart rate in the 70s. 1/14/21: Advanced cardiomyopathy. .  Responded to extra diuretics. Also responded to Venofer injection. Hemoglobin is 9. BUN is elevated with creatinine stable 1.1 potassium is 4.3. Will give extra 40 mg Lasix IV. Also Zaroxolyn 2.5 extra. And will give 1 more dose of venofer    1/13/21: Resting comfortably in bed in no acute distress. Complains of orthopnea this morning and felt better when sitting up. Breathing comfortably on room air at this time. Has significant bilateral lower extremity edema with pitting into his thighs. States that his lower extremity edema has worsened over the past month and he has been increasingly fatigued and tired and short of breath with activity. Currently on IV diuresis with Lasix 20 mg IV daily with approximately 2300 mL negative fluid balance since admission. Patient will likely benefit from more aggressive diuresis. Will give additional Lasix 40 mg IV x1 this evening. Patient follows with cardiology through [facility], Dr. Torie Briggs. Had echocardiogram at [facility] in October 2020 which showed EF of 36% at that time. Patient had cardiac catheterization prior to TAVR in 2017 which revealed mild CAD. Has not had any cardiac catheterization since that time. He is hemodynamically stable. On telemetry A. fib with V pacing and heart rate in the 80s. He is on oral anticoagulation with Eliquis 5 mg p.o. twice daily. 1/12/21: Shortness of breath is still present. Moderate. Urine output -1.6 L overnight. Hemoglobin is 9.1. Will give venofer    1/11/21: Known to have ischemic cardiomyopathy status post AICD status post TAVR moderate renal insufficiency. Minimal exercise tolerance some ankle edema. No ICD shocks    1/10/21: Cardiac history of a TAVR in the past.  Ventricular tachycardia status post AICD all the cardiac work-up was done at [facility]. Still drives. Just minimal exertion he gets short of breath no ICD shocks. Review of Systems:   Review of Systems   Constitutional: Positive for fatigue. Negative for activity change and fever. HENT: Negative for congestion. Respiratory: Positive for shortness of breath. Negative for chest tightness. Cardiovascular: Positive for leg swelling. Negative for chest pain and palpitations. +orthopnea     Gastrointestinal: Negative for abdominal pain, nausea and vomiting. Genitourinary: Negative for difficulty urinating. Musculoskeletal: Negative for arthralgias. Skin: Negative for color change, pallor and rash. Neurological: Negative for dizziness and syncope. Psychiatric/Behavioral: Negative for agitation.          Physical Examination: LMT: The LMT has mild diffuse disease. LAD: The LAD has mild diffuse disease. LCX: The Circumflex has mild diffuse disease. RAMUS: The Ramus is Absent. RCA: The RCA has mild diffuse disease. Additional Comment: dominant vessel. IMPRESSION PLAN    Impression:- mild diffuse disease    Recommended Treatment: Medical Therapy and Valve repair / replacement. Plan: - no coronary intervention required at the time of TAVR        EKG 1/9/21: A-fib 88, LBBB, T wave inversion in leads I, aVL, QTc 517ms    Telemetry 1/13/21: A-fib with V-pacing 80s  Telemetry 1/15/21: ?A-fib with V-pacing 70s        Assessment:    Active Hospital Problems    Diagnosis Date Noted    Acute systolic CHF (congestive heart failure) ([facility] [address] [I50.21] 01/13/2021     Priority: Low    AICD (automatic cardioverter/defibrillator) present [Z95.810]      Priority: Low    Acute renal failure superimposed on stage 3a chronic kidney disease ([facility] [address] [N17.9, N18.31]      Priority: Low    [facility] [I48.19] 07/30/2019     Priority: Low     1. Acute systolic CHF-improved. 2. Worsening cardiomyopathy with EF 15% per echo 1/9/21--prior echo at [facility] 10/2020 with EF 36%  3. Moderately severe MR per echo 1/9/21  4. Hx TAVR  5. PHTN with RVSP 58mmHg  6. Chronic atrial fibrillation  7. Hx CVA  8. LBBB  9. Hx of BiV ICD implant 6/5/2018  10. Mild CAD per [facility] 6/29/17 at [facility] prior to TAVR       Plan:  1. Maximize medical therapyCoreg 25 mg p.o. twice daily, Entresto 24/26 mg p.o. twice daily, Lipitor 40 mg tablet p.o. daily at bedtime, oral anticoagulation with Eliquis 5 mg p.o. twice daily, potassium chloride 20meq PO daily. Change Lasix to 40 mg p.o. daily. 2. Consider addition of metolazone 2.5mg PO 2-3 times weekly at discharge as patient has responded well to PRN doses as inpatient  3. Cardiac/less than 2 g sodium diet recommended  4.  Check daily weight and strict intake and output  5. 1500 mL daily fluid restriction

## 2021-01-16 NOTE — PROGRESS NOTES
Nutrition Assessment     Type and Reason for Visit: Initial, RD Nutrition Re-Screen/LOS    Nutrition Recommendations/Plan: Continue current diet and ONS bid. Nutrition Assessment:  Pt admitted for CHF. Stable from a nutritional standpoint. PO intakes adequate and weight appears stable. No nutritional dx at this time. Malnutrition Assessment:  Malnutrition Status: No malnutrition     Current Nutrition Therapies:    Dietary Nutrition Supplements: Standard High Calorie Oral Supplement  DIET LOW SODIUM 2 GM;     Anthropometric Measures:  · Height: 5' 3\" (160 cm)  · Current Body Wt: 165 lb (74.8 kg)(stated)   · BMI: 29.2    Nutrition Diagnosis:   No nutrition diagnosis at this time     Nutrition Interventions:   Food and/or Nutrient Delivery:  Continue Current Diet, Continue Oral Nutrition Supplement  Nutrition Education/Counseling:  Education not indicated   Coordination of Nutrition Care:  No recommendation at this time    Goals:  Pt to continue good PO intakes <75% and maintain wt       Nutrition Monitoring and Evaluation:   Food/Nutrient Intake Outcomes:  Food and Nutrient Intake, Supplement Intake  Physical Signs/Symptoms Outcomes:  Weight     Discharge Planning:    No discharge needs at this time     Electronically signed by Blayne Olea, MS, RD, LD on 1/16/21 at 3:03 PM EST

## 2021-01-16 NOTE — DISCHARGE INSTR - COC
Continuity of Care Form    Patient Name: Evelin Cheung. :  1932  MRN:  [id number]    Admit date:  2021  Discharge date:  2021    Code Status Order: Full Code   Advance Directives:   Advance Care Flowsheet Documentation     Date/Time Healthcare Directive Type of Healthcare Directive Copy in [address] Agent's Name Healthcare Agent's Phone Number    [phone]  Yes, patient has an advance directive for healthcare treatment  Durable power of  for health care  No, copy requested from family    Wife - Bonifacio Nearing #1 / Son - Ross Ren          Admitting Physician:  Sudhir Pendleton DO  PCP: Cammy Riojas MD    Discharging Nurse: benjamin  [address] Unit/Room#: [address] Unit Phone Number: [phone]    Emergency Contact:   Extended Emergency Contact Information  Primary Emergency Contact: Christiano Rosas  Address: [address] [place] [address] Phone: [phone]  Relation: Spouse  Secondary Emergency Contact: Shaggy Staples Phone: [phone]  Relation: Child    Past Surgical History:  Past Surgical History:   Procedure Laterality Date    PACEMAKER PLACEMENT         Immunization History: There is no immunization history on file for this patient.     Active Problems:  Patient Active Problem List   Diagnosis Code    Acute CVA (cerebrovascular accident) ([facility] [address] I63.9    Acute ischemic stroke ([facility] [address] I63.9    Atrial fibrillation, persistent (HCC) I48.19    Chronic systolic CHF (congestive heart failure) (HCC) I50.22    Dysuria R30.0    Essential hypertension I10    Hepatic lesion K76.9    Low sodium levels E87.1    Malignant carcinoid tumor of cecum ([facility] [address] C7A.021    Nonrheumatic aortic valve stenosis I35.0    OA (osteoarthritis) of hip M16.9    Retention of urine R33.9    S/P hip replacement Z96.649    S/P TAVR (transcatheter aortic valve replacement) Z95.2  Primary insomnia F51.01    Memory loss R41.3    Acute diastolic heart failure ([facility]) L21.18    Acute systolic CHF (congestive heart failure) ([facility]) I50.21    AICD (automatic cardioverter/defibrillator) present Z95.810    Acute renal failure superimposed on stage 3a chronic kidney disease ([facility]) N17.9, N18.31       Isolation/Infection:   Isolation          No Isolation        Patient Infection Status     Infection Onset Added Last Indicated Last Indicated By Review Planned Expiration Resolved Resolved By    None active    Resolved    COVID-19 Rule Out 01/09/21 01/09/21 01/09/21 COVID-19 (Ordered)   01/09/21 Rule-Out Test Resulted          Nurse Assessment:  Last Vital Signs: /75   Pulse 72   Temp 97.3 °F (36.3 °C) (Oral)   Resp 19   Ht 5' 3\" (1.6 m)   Wt 165 lb 6.4 oz (75 kg)   SpO2 100%   BMI 29.30 kg/m²     Last documented pain score (0-10 scale): Pain Level: 8  Last Weight:   Wt Readings from Last 1 Encounters:   01/13/21 165 lb 6.4 oz (75 kg)     Mental Status:  oriented, alert, coherent, logical, thought processes intact and able to concentrate and follow conversation    IV Access:  - None    Nursing Mobility/ADLs:  Walking   Assisted  Transfer  {CHP DME EFUO:607746090}  Bathing  {CHP DME MASS:530789464}  Dressing  {CHP DME AETH:276899885}  Toileting  {CHP DME VDPM:812896196}  Feeding  {CHP DME XFAE:239364924}  Med Admin  {CHP DME LVPH:051813807}  Med Delivery   whole    Wound Care Documentation and Therapy:        Elimination:  Continence:   · Bowel: {YES / YF:84382}  · Bladder: Yes  Urinary Catheter: None   Colostomy/Ileostomy/Ileal Conduit: No       Date of Last BM: ***  No intake or output data in the 24 hours ending 01/16/21 1805  No intake/output data recorded. Safety Concerns:      At Risk for Falls    Impairments/Disabilities:      Hearing    Nutrition Therapy:  Current Nutrition Therapy:   - Oral Diet:  Low Sodium (2gm)    Routes of Feeding: Oral  Liquids: No Restrictions Daily Fluid Restriction: yes - amount 1.5 liters  Last Modified Barium Swallow with Video (Video Swallowing Test): not done    Treatments at the Time of Hospital Discharge:   Respiratory Treatments: ***  Oxygen Therapy:  is not on home oxygen therapy. Ventilator:    - No ventilator support    Rehab Therapies: Physical Therapy and Occupational Therapy  Weight Bearing Status/Restrictions: No weight bearing restirctions  Other Medical Equipment (for information only, NOT a DME order):  walker and bath bench  Other Treatments: ***    Patient's personal belongings (please select all that are sent with patient):  None    RN SIGNATURE:  Electronically signed by Erlin Cifuentes RN on 1/16/21 at 6:10 PM EST    CASE MANAGEMENT/SOCIAL WORK SECTION    Inpatient Status Date: ***    Readmission Risk Assessment Score:  Readmission Risk              Risk of Unplanned Readmission:        14           Discharging to Facility/ Agency   · Name:   · Address:  · Phone:  · Fax:    Dialysis Facility (if applicable)   · Name:  · Address:  · Dialysis Schedule:  · Phone:  · Fax:    / signature: {Esignature:[id number]}    PHYSICIAN SECTION    Prognosis: {Prognosis:1801413065}    Condition at Discharge: 50Jaime Weems Karson Patient Condition:216037516}    Rehab Potential (if transferring to Rehab): {Prognosis:9642703025}    Recommended Labs or Other Treatments After Discharge: ***    Physician Certification: I certify the above information and transfer of Hnanah Ortiz.  is necessary for the continuing treatment of the diagnosis listed and that he requires {Admit to Appropriate Level of Care:19220} for {GREATER/LESS:884609792} 30 days.      Update Admission H&P: {CHP DME Changes in XEHOE:875800916}    PHYSICIAN SIGNATURE:  {Esignature:816681316}

## 2021-01-16 NOTE — DISCHARGE SUMMARY
[facility] Medicine Discharge Summary    Eduar Hooper :  1932  MRN:  [id number]    Admit date:  2021  Discharge date:  2021    Admitting Physician:  Anabel Leija DO  Primary Care Physician:  Gaviota Guzmán MD    Discharge Diagnoses:    Principal Problem:    Acute systolic CHF (congestive heart failure) ([address]  Active Problems:    Atrial fibrillation, persistent ([address]    AICD (automatic cardioverter/defibrillator) present    Acute renal failure superimposed on stage 3a chronic kidney disease ([address]  Resolved Problems:    * No resolved hospital problems. *    Chief Complaint   Patient presents with    Shortness of Breath     pt c/o SOB that started this AM       Condition: improved  Activity: no strenuous activity   Diet: low sodium  Disposition: home with home care  Functional Status: ambulatory with supervision    Significant Findings:     Labs Renal Latest Ref Rng & Units 2021 1/15/2021 2021 2021 2021   BUN 8 - 23 mg/dL 58(H) 56(H) 58(H) 67(H) 71(H)   Cr 0.70 - 1.20 mg/dL 1.04 1.06 1.18 1.38(H) 1.91(H)   K 3.4 - 4.9 mEq/L 4.0 3.6 3.4 4.1 4.0   Na 135 - 144 mEq/L 133(L) 132(L) 130(L) 131(L) 126(L)     Admission Cr 1.97  Admission BNP 42,502     TTE:   Summary   Moderately severe (3+) mitral regurgitation is present. S/P TAVR   Peak/mean gradient 17/7 mm Hg YASMINE 0.9 cm2   Normal tricuspid valve structure and function. There is mild to moderate ( 2 +) tricuspid regurgitation with estimated   RVSP of 58 mm Hg. Moderately dilated left atrium. Left ventricular ejection fraction is visually estimated at 15%. Abnormal (paradoxical) motion consistent with RV pacemaker. Restrictive filling pattern. Normal right ventricle structure and function. Pacer wire visualized in right ventricle.       Hospital Course: 80-year-old male with history of CAD, ischemic cardiomyopathy EF 15% (35% in Oct 2020) s/p AICD/CRT, chronic atrial fibrillation on Eliquis, TIA, severe AS s/p TAVR presented on 1/9 with worsening dyspnea. He was found to have acute decompensation of chronic systolic heart failure with worsening EF (15%) compared to 3 months prior (35%). He also had cardiorenal PAYTON on admission. He improved significantly with IV diuresis with the help of metolazone and his renal function returned to baseline. He will be discharged on the regimen below and will follow up with his PCP and primary cardiologist through [facility]. Key Medications / Medications changes:  Diuretic: Lasix 40mg daily. Metolazone 2.5mg twice a week  Carvedilol increased to 25mg bid  Continue Entresto    To Do:  [ ] f/u with PCP, cardiology    Exam on Discharge:   /75   Pulse 72   Temp 97.3 °F (36.3 °C) (Oral)   Resp 19   Ht 5' 3\" (1.6 m)   Wt 165 lb 6.4 oz (75 kg)   SpO2 100%   BMI 29.30 kg/m²   General appearance: elderly, frail. alert, cooperative and no distress  Mental Status: oriented to person, place and time and normal affect  Lungs: clear to auscultation bilaterally, normal effort  Heart: regular rate and rhythm, no murmur  Abdomen: soft, nontender, nondistended, bowel sounds present, no masses  Extremities: trace pitting edema  Skin: no gross lesions, rashes    Discharge Medication List:   Judy Fuel    Home Medication Instructions [facility]:[id number]    Printed on:01/16/21 8588   Medication Information                      apixaban (ELIQUIS) 5 MG TABS tablet  Take 1 tablet by mouth 2 times daily             atorvastatin (LIPITOR) 40 MG tablet  Take 1 tablet by mouth nightly             carvedilol (COREG) 25 MG tablet  Take 1 tablet by mouth 2 times daily (with meals) HOLD for SBP<100             Ferrous Sulfate (IRON) 28 MG TABS  TAKE 1 TAB BY MOUTH EVERY OTHER DAY             furosemide (LASIX) 40 MG tablet Take 1 tablet by mouth daily             furosemide (LASIX) 40 MG tablet  Take 1 tablet by mouth daily             melatonin 3 MG TABS tablet  Take 3 mg by mouth daily             metOLazone (ZAROXOLYN) 2.5 MG tablet  Take 1 tablet by mouth Twice a Week On Monday and Friday ONLY             octreotide ACETATE (SANDOSTATIN LAR) 20 MG injection  INJECT 20MG INTRAMUSCULARLY EVERY 4 WEEKS             potassium chloride (KLOR-CON M) 20 MEQ extended release tablet  Take 1 tablet by mouth daily (with breakfast)             sacubitril-valsartan (ENTRESTO) 24-26 MG per tablet  Take 1 tablet by mouth 2 times daily                 DC time 37 minutes    Signed:  Clemon Schilder  1/16/2021, 3:19 PM

## 2021-01-17 NOTE — FLOWSHEET NOTE
Pt assisted with getting dresses in prep for going home. Pts wife here also. Nurse reviewed dc instructions with the wife. Understanding voiced. 200  Pt and wife wheeled down to the front lobby where their son is waiting.

## 2021-01-22 LAB
ANION GAP SERPL CALCULATED.3IONS-SCNC: 9 MEQ/L (ref 9–15)
BUN BLDV-MCNC: 34 MG/DL (ref 8–23)
CALCIUM SERPL-MCNC: 8.4 MG/DL (ref 8.5–9.9)
CHLORIDE BLD-SCNC: 88 MEQ/L (ref 95–107)
CO2: 31 MEQ/L (ref 20–31)
CREAT SERPL-MCNC: 1 MG/DL (ref 0.7–1.2)
GFR AFRICAN AMERICAN: >60
GFR NON-AFRICAN AMERICAN: >60
GLUCOSE BLD-MCNC: 101 MG/DL (ref 70–99)
POTASSIUM SERPL-SCNC: 3.7 MEQ/L (ref 3.4–4.9)
SODIUM BLD-SCNC: 128 MEQ/L (ref 135–144)

## 2023-01-26 NOTE — DISCHARGE SUMMARY
Physician Discharge Summary     Patient ID:  Latasha Scales  [id number]  89 y.o.  11/19/1932    Admit date: 4/11/2019    Discharge date and time: 4/15/19    Admitting Physician: Bonifacio Cid MD     Discharge Physician: [facility]    Admission Diagnoses: Acute CVA (cerebrovascular accident) [facility]) [I63.9]    Discharge Diagnoses: Acute CVA, Afib  Past Medical History:   Diagnosis Date    Atrial fibrillation ([facility] [address]     Colon cancer [facility])      Patient Active Problem List   Diagnosis    Acute CVA (cerebrovascular accident) ([facility] [address]    [facility]         Admission Condition: stable    Discharged Condition: stable  Indication for Admission: CVA    Hospital Course: CT head, MRI of head, ANA, pt refused TPA, heparin drip started  Chronic systolic CHF  Consults: neuro, cadiology    Significant Diagnostic Studies:  Lab Results   Component Value Date    WBC 5.2 04/15/2019    HGB 11.6 (L) 04/15/2019    HCT 36.2 (L) 04/15/2019    MCV 69.2 (L) 04/15/2019     (L) 04/15/2019     Lab Results   Component Value Date     04/15/2019    K 4.3 04/15/2019    CL 99 04/15/2019    CO2 26 04/15/2019    BUN 14 04/15/2019    CREATININE 0.87 04/15/2019    GLUCOSE 98 04/15/2019    CALCIUM 8.8 04/15/2019          Treatments:   Current Facility-Administered Medications   Medication Dose Route Frequency Provider Last Rate Last Dose    0.9 % sodium chloride infusion   Intravenous Continuous Skyler Narayan MD 75 mL/hr at 04/15/19 0532      sodium chloride flush 0.9 % injection 10 mL  10 mL Intravenous 2 times per day Skyler Narayan MD        sodium chloride flush 0.9 % injection 10 mL  10 mL Intravenous PRN Skyler Narayan MD   10 mL at 04/15/19 0532    0.9 % sodium chloride infusion   Intravenous Continuous Skyler Narayan MD        sodium chloride flush 0.9 % injection 10 mL  10 mL Intravenous 2 times per day Skyler Narayan MD        sodium chloride flush 0.9 % injection 10 mL  10 mL Intravenous PRN Skyler Narayan MD       [place] Sos Staging Info: By selecting yes to the question above you will include information on AJCC 8 tumor staging in your Mohs note. Information on tumor staging will be automatically added for SCCs on the head and neck. AJCC 8 includes tumor size, tumor depth, perineural involvement and bone invasion.